# Patient Record
Sex: FEMALE | Race: WHITE | NOT HISPANIC OR LATINO | Employment: FULL TIME | URBAN - METROPOLITAN AREA
[De-identification: names, ages, dates, MRNs, and addresses within clinical notes are randomized per-mention and may not be internally consistent; named-entity substitution may affect disease eponyms.]

---

## 2016-06-03 LAB
HPV ABSTRACT: NORMAL
PAP-ABSTRACT: NORMAL

## 2018-09-17 ENCOUNTER — TRANSFERRED RECORDS (OUTPATIENT)
Dept: HEALTH INFORMATION MANAGEMENT | Facility: CLINIC | Age: 48
End: 2018-09-17

## 2018-11-13 ENCOUNTER — TRANSFERRED RECORDS (OUTPATIENT)
Dept: HEALTH INFORMATION MANAGEMENT | Facility: CLINIC | Age: 48
End: 2018-11-13

## 2018-12-04 ENCOUNTER — TRANSFERRED RECORDS (OUTPATIENT)
Dept: HEALTH INFORMATION MANAGEMENT | Facility: CLINIC | Age: 48
End: 2018-12-04

## 2018-12-24 ENCOUNTER — TRANSFERRED RECORDS (OUTPATIENT)
Dept: HEALTH INFORMATION MANAGEMENT | Facility: CLINIC | Age: 48
End: 2018-12-24

## 2019-01-28 ENCOUNTER — OFFICE VISIT (OUTPATIENT)
Dept: FAMILY MEDICINE | Facility: CLINIC | Age: 49
End: 2019-01-28
Payer: COMMERCIAL

## 2019-01-28 VITALS
TEMPERATURE: 98.4 F | HEART RATE: 75 BPM | WEIGHT: 177 LBS | OXYGEN SATURATION: 97 % | BODY MASS INDEX: 28.45 KG/M2 | DIASTOLIC BLOOD PRESSURE: 72 MMHG | SYSTOLIC BLOOD PRESSURE: 110 MMHG | HEIGHT: 66 IN

## 2019-01-28 DIAGNOSIS — H66.001 ACUTE SUPPURATIVE OTITIS MEDIA OF RIGHT EAR WITHOUT SPONTANEOUS RUPTURE OF TYMPANIC MEMBRANE, RECURRENCE NOT SPECIFIED: ICD-10-CM

## 2019-01-28 PROBLEM — B00.9 HERPES SIMPLEX VIRUS (HSV) INFECTION: Status: ACTIVE | Noted: 2019-01-28

## 2019-01-28 PROCEDURE — 99203 OFFICE O/P NEW LOW 30 MIN: CPT | Performed by: NURSE PRACTITIONER

## 2019-01-28 RX ORDER — MULTIPLE VITAMINS W/ MINERALS TAB 9MG-400MCG
1 TAB ORAL DAILY
COMMUNITY

## 2019-01-28 RX ORDER — AMOXICILLIN 500 MG/1
500 CAPSULE ORAL 3 TIMES DAILY
Qty: 21 CAPSULE | Refills: 0 | Status: SHIPPED | OUTPATIENT
Start: 2019-01-28 | End: 2019-07-10

## 2019-01-28 RX ORDER — ACYCLOVIR 400 MG/1
400 TABLET ORAL
COMMUNITY
Start: 2018-11-13 | End: 2020-02-17 | Stop reason: ALTCHOICE

## 2019-01-28 RX ORDER — CHLORAL HYDRATE 500 MG
2 CAPSULE ORAL DAILY
COMMUNITY

## 2019-01-28 SDOH — HEALTH STABILITY: MENTAL HEALTH: HOW OFTEN DO YOU HAVE A DRINK CONTAINING ALCOHOL?: MONTHLY OR LESS

## 2019-01-28 SDOH — HEALTH STABILITY: MENTAL HEALTH: HOW OFTEN DO YOU HAVE 6 OR MORE DRINKS ON ONE OCCASION?: NOT ASKED

## 2019-01-28 SDOH — HEALTH STABILITY: MENTAL HEALTH: HOW MANY STANDARD DRINKS CONTAINING ALCOHOL DO YOU HAVE ON A TYPICAL DAY?: 1 OR 2

## 2019-01-28 ASSESSMENT — MIFFLIN-ST. JEOR: SCORE: 1453.59

## 2019-01-28 NOTE — PROGRESS NOTES
SUBJECTIVE:   Kimberly Rankin is a 48 year old female who presents to clinic today for right ear pain accompanied with runny nose, cough with grey/yellow sputum, congestion and crusting in ear that she reported had drained last night. She denies fever or chills. She does have a history of recurrent right ear infections with colds. Her daughter currently has a cold but no one else in the household is sick.      Acute Illness   Acute illness concerns: ear pain  Onset: 2-3 days    Fever: no    Chills/Sweats: no    Headache (location?): no    Sinus Pressure:no    Conjunctivitis:  no    Ear Pain: YES- Rt ear pain and drainage    Rhinorrhea: YES    Congestion: YES    Sore Throat: YES- last week      Cough: YES-productive of yellow sputum    Wheeze: no    Decreased Appetite: no    Nausea: no    Vomiting: no    Diarrhea:  no    Dysuria/Freq.: no    Fatigue/Achiness: YES    Sick/Strep Exposure: no     Therapies Tried and outcome: OTC expectorant and ibuprofen       Last CPE at OB/GYN was in December 2018, last pap smear completed in 2016, NIL and negative HPV found in care everywhere.      Problem list and histories reviewed & adjusted, as indicated.  Additional history: as documented    Patient Active Problem List   Diagnosis     Herpes simplex virus (HSV) infection - genital     Intramural leiomyoma of uterus     Temporomandibular joint disorder (TMJ)     Allergic rhinitis     Abnormal involuntary movement     History reviewed. No pertinent surgical history.    Social History     Tobacco Use     Smoking status: Never Smoker     Smokeless tobacco: Never Used   Substance Use Topics     Alcohol use: Yes     Frequency: Monthly or less     Drinks per session: 1 or 2     Family History   Problem Relation Age of Onset     Heart Disease Father      Dementia Father      Dementia Mother      Kidney Disease Daughter          Current Outpatient Medications   Medication Sig Dispense Refill     acyclovir (ZOVIRAX) 400 MG tablet Take  "400 mg by mouth       amoxicillin (AMOXIL) 500 MG capsule Take 1 capsule (500 mg) by mouth 3 times daily for 7 days 21 capsule 0     cholecalciferol (VITAMIN D-1000 MAX ST) 1000 units TABS Take 1 tablet by mouth every 24 hours       fish oil-omega-3 fatty acids 1000 MG capsule Take 2 g by mouth daily       multivitamin w/minerals (MULTI-VITAMIN) tablet Take 1 tablet by mouth daily       Probiotic Product (PROBIOTIC-10 PO)        No Known Allergies    Reviewed and updated as needed this visit by clinical staff  Tobacco  Allergies  Med Hx  Surg Hx  Fam Hx  Soc Hx      Reviewed and updated as needed this visit by Provider  Tobacco  Surg Hx  Fam Hx  Soc Hx        ROS:  CONSTITUTIONAL: NEGATIVE for fever, or chills  ENT/MOUTH: Right ear pain with drainage in the night, runny nose, denies throat and mouth pain, right ear with diminished hearing  RESP: Positive for cough with grey, yellow sputum, negative shortness of breath/wheezing  CV: NEGATIVE for chest pain or palpitations    OBJECTIVE:     /72   Pulse 75   Temp 98.4  F (36.9  C) (Oral)   Ht 1.683 m (5' 6.25\")   Wt 80.3 kg (177 lb)   LMP 01/13/2019 (Approximate)   SpO2 97%   BMI 28.35 kg/m    Body mass index is 28.35 kg/m .    GENERAL: healthy, alert and no distress  EYES: Eyes grossly normal to inspection, PERRL and conjunctivae and sclerae normal  HENT: Right ear TM is erythematous without clear bony landmarks, Left ear TM intact without erythema, bony landmarks present, negative for tragus tenderness to palpation bilaterally, nose and mouth without ulcers, lesions or erythema. Negative for sinus pain on palpation  NECK: no adenopathy, no asymmetry, masses, or scars   RESP: lungs clear to auscultation - no rales, rhonchi or wheezes  CV: regular rate and rhythm, normal S1 S2, no S3 or S4, no murmur, click or rub      Diagnostic Test Results:  none     ASSESSMENT/PLAN:     Kimberly was seen today for ear problem.    Diagnoses and all orders for " this visit:    Acute suppurative otitis media of right ear without spontaneous rupture of tympanic membrane, recurrence not specified  -     amoxicillin (AMOXIL) 500 MG capsule; Take 1 capsule (500 mg) by mouth 3 times daily for 7 days    Educated patient that they should be feeling better in the next 48-72 hours and if not to call or come in to the clinic. Tylenol can be taken prn for pain.     Pilar Gomez, RN, FNP Student    History and physical examination done with student nurse practitioner.  Student acted as scribe for this encounter.  I agree with assessment and plan for this patient.     [facility]

## 2019-01-28 NOTE — Clinical Note
Pt had a PAP on 6/14/16 at [facility]  Please abstract results and update health maintenance.  Pt had a Mammogran on 1/24/18 at [facility] "[employer]  Please abstract results and update health [facility].  Pt had a colonoscopy on 12/24/18 at [facility] "[employer]  Please abstract results and update health maintenance.

## 2019-01-28 NOTE — PATIENT INSTRUCTIONS
Kimberly was seen today for ear problem.    Diagnoses and all orders for this visit:    Acute suppurative otitis media of right ear without spontaneous rupture of tympanic membrane, recurrence not specified  -     amoxicillin (AMOXIL) 500 MG capsule; Take 1 capsule (500 mg) by mouth 3 times daily for 7 days  -     Tylenol or Ibuprofen as needed.      Follow-up with no improvement or worsening of symptoms.

## 2019-07-10 ENCOUNTER — OFFICE VISIT (OUTPATIENT)
Dept: FAMILY MEDICINE | Facility: CLINIC | Age: 49
End: 2019-07-10
Payer: COMMERCIAL

## 2019-07-10 VITALS
HEIGHT: 66 IN | BODY MASS INDEX: 28.28 KG/M2 | TEMPERATURE: 98.3 F | HEART RATE: 75 BPM | SYSTOLIC BLOOD PRESSURE: 122 MMHG | DIASTOLIC BLOOD PRESSURE: 76 MMHG | OXYGEN SATURATION: 98 % | WEIGHT: 176 LBS

## 2019-07-10 DIAGNOSIS — S83.206A TEAR OF MENISCUS OF RIGHT KNEE AS CURRENT INJURY, UNSPECIFIED MENISCUS, UNSPECIFIED TEAR TYPE, INITIAL ENCOUNTER: ICD-10-CM

## 2019-07-10 DIAGNOSIS — Z01.818 PREOP GENERAL PHYSICAL EXAM: Primary | ICD-10-CM

## 2019-07-10 DIAGNOSIS — E66.3 OVERWEIGHT (BMI 25.0-29.9): ICD-10-CM

## 2019-07-10 DIAGNOSIS — Z92.89: ICD-10-CM

## 2019-07-10 LAB
ERYTHROCYTE [DISTWIDTH] IN BLOOD BY AUTOMATED COUNT: 16.1 % (ref 10–15)
HCG UR QL: NEGATIVE
HCT VFR BLD AUTO: 38.2 % (ref 35–47)
HGB BLD-MCNC: 12.5 G/DL (ref 11.7–15.7)
MCH RBC QN AUTO: 28.2 PG (ref 26.5–33)
MCHC RBC AUTO-ENTMCNC: 32.7 G/DL (ref 31.5–36.5)
MCV RBC AUTO: 86 FL (ref 78–100)
PLATELET # BLD AUTO: 246 10E9/L (ref 150–450)
RBC # BLD AUTO: 4.44 10E12/L (ref 3.8–5.2)
WBC # BLD AUTO: 3.4 10E9/L (ref 4–11)

## 2019-07-10 PROCEDURE — 36415 COLL VENOUS BLD VENIPUNCTURE: CPT | Performed by: PHYSICIAN ASSISTANT

## 2019-07-10 PROCEDURE — 80048 BASIC METABOLIC PNL TOTAL CA: CPT | Performed by: PHYSICIAN ASSISTANT

## 2019-07-10 PROCEDURE — 81025 URINE PREGNANCY TEST: CPT | Performed by: PHYSICIAN ASSISTANT

## 2019-07-10 PROCEDURE — 85027 COMPLETE CBC AUTOMATED: CPT | Performed by: PHYSICIAN ASSISTANT

## 2019-07-10 PROCEDURE — 99214 OFFICE O/P EST MOD 30 MIN: CPT | Performed by: PHYSICIAN ASSISTANT

## 2019-07-10 ASSESSMENT — MIFFLIN-ST. JEOR: SCORE: 1444.05

## 2019-07-10 NOTE — PROGRESS NOTES
[facility]  [address]  [place] [address]  [phone]  Dept: [phone]    PRE-OP EVALUATION:  Today's date: 7/10/2019    Kimberly Rankin (: 1970) presents for pre-operative evaluation assessment as requested by Dr. Roosevelt Barton.  She requires evaluation and anesthesia risk assessment prior to undergoing surgery/procedure for treatment of R knee meniscal tear.    Proposed Surgery/ Procedure: Right knee arthoscopy  Date of Surgery/ Procedure: 2019  Time of Surgery/ Procedure: 8 am  Hospital/Surgical Facility: [facility]  Fax number for surgical facility: [phone]  Primary Physician: [facility], [facility] [place]  Type of Anesthesia Anticipated: Pt not sure     Patient has a Health Care Directive or Living Will:  NO    1. NO - Do you have a history of heart attack, stroke, stent, bypass or surgery on an artery in the head, neck, heart or legs?  2. NO - Do you ever have any pain or discomfort in your chest?  3. NO - Do you have a history of  Heart Failure?  4. NO - Are you troubled by shortness of breath when: walking on the level, up a slight hill or at night?  5. NO - Do you currently have a cold, bronchitis or other respiratory infection?  6. NO - Do you have a cough, shortness of breath or wheezing?  7. NO - Do you sometimes get pains in the calves of your legs when you walk?  8. NO - Do you or anyone in your family have previous history of blood clots?  9. NO - Do you or does anyone in your family have a serious bleeding problem such as prolonged bleeding following surgeries or cuts?  10. NO - Have you ever had problems with anemia or been told to take iron pills?  11. NO - Have you had any abnormal blood loss such as black, tarry or bloody stools, or abnormal vaginal bleeding?  12. NO - Have you ever had a blood transfusion?  13. YES - stopped breathing and required ambulance transfer at dental office in  when she was getting her teeth pulled.  She is not sure if she got too much or what the problem was. Have you or any of your relatives ever had problems with anesthesia?   14. NO - Do you have sleep apnea, excessive snoring or daytime drowsiness?  15. NO - Do you have any prosthetic heart valves?  16. NO - Do you have prosthetic joints?  17. NO - Is there any chance that you may be pregnant?      HPI:     HPI related to upcoming procedure: Kimberly is a 48 yo female here today fore pre-op. She has been suffering with R knee pain x2 months. Was evaluated by [facility] and found to have a meniscal tear. Is now pursuing arthroscopy.     LMP: 6/15   has had a vasectomy    See problem list for active medical problems.  Problems all longstanding and stable, except as noted/documented.  See ROS for pertinent symptoms related to these conditions.      MEDICAL HISTORY:     Patient Active Problem List    Diagnosis Date Noted     Overweight (BMI 25.0-29.9) 07/12/2019     Priority: Medium     History of adverse effect of anesthesia 07/12/2019     Priority: Medium     Tear of meniscus of right knee as current injury, unspecified meniscus, unspecified tear type, initial encounter 07/10/2019     Priority: Medium     Herpes simplex virus (HSV) infection - genital 01/28/2019     Priority: Medium     Overview:   Herpes simplex without mention of complication       Intramural leiomyoma of uterus 06/08/2016     Priority: Medium     Allergic rhinitis 06/07/2003     Priority: Medium     Overview:   Rhinitis Allergic  NOS        Past Medical History:   Diagnosis Date     Abnormal involuntary movement 9/29/2009    Overview:  LW Modifier:  left shoulder and thigh LW Onset:  9/1/2009 ; Fasciculations Benign     Genital herpes     taking acyclovir regulary as a suppressive medicatino     Otitis media     Patient reports having this a recurrent problem with colds     Seasonal allergies     Dogs and Cats     Temporomandibular joint disorder (TMJ) 12/16/2009    Overview:  LW Modifier:  " eval at [facility] LW Onset:  2009 ; Temporomandibular Joint Syndrome     History reviewed. No pertinent surgical history.  Current Outpatient Medications   Medication Sig Dispense Refill     acyclovir (ZOVIRAX) 400 MG tablet Take 400 mg by mouth       cholecalciferol (VITAMIN D-1000 MAX ST) 1000 units TABS Take 1 tablet by mouth every 24 hours       fish oil-omega-3 fatty acids 1000 MG capsule Take 2 g by mouth daily       multivitamin w/minerals (MULTI-VITAMIN) tablet Take 1 tablet by mouth daily       Probiotic Product (PROBIOTIC-10 PO)        OTC products: None, except as noted above and a thyroid supplement     No Known Allergies   Latex Allergy: NO    Social History     Tobacco Use     Smoking status: Never Smoker     Smokeless tobacco: Never Used   Substance Use Topics     Alcohol use: Yes     Frequency: Monthly or less     Drinks per session: 1 or 2     History   Drug Use No       REVIEW OF SYSTEMS:   CONSTITUTIONAL: NEGATIVE for fever, chills, change in weight  INTEGUMENTARY/SKIN: NEGATIVE for worrisome rashes, moles or lesions  EYES: NEGATIVE for vision changes or irritation  ENT/MOUTH: NEGATIVE for ear, mouth and throat problems  RESP: NEGATIVE for significant cough or SOB  CV: NEGATIVE for chest pain, palpitations or peripheral edema  GI: NEGATIVE for nausea, abdominal pain, heartburn, or change in bowel habits  : NEGATIVE for frequency, dysuria, or hematuria  MUSCULOSKELETAL: + for R knee pain. NEGATIVE for significant arthralgias or myalgia  NEURO: NEGATIVE for weakness, dizziness or paresthesias  ENDOCRINE: NEGATIVE for temperature intolerance, skin/hair changes  HEME: NEGATIVE for bleeding problems  PSYCHIATRIC: NEGATIVE for changes in mood or affect    EXAM:   /76 (BP Location: Right arm, Cuff Size: Adult Regular)   Pulse 75   Temp 98.3  F (36.8  C) (Oral)   Ht 1.683 m (5' 6.25\")   Wt 79.8 kg (176 lb)   SpO2 98%   BMI 28.19 kg/m      GENERAL APPEARANCE: healthy, alert and no distress     " EYES: EOMI, PERRL     HENT: ear canals and TM's normal and nose and mouth without ulcers or lesions     NECK: no adenopathy, no asymmetry, masses, or scars and thyroid normal to palpation     RESP: lungs clear to auscultation - no rales, rhonchi or wheezes     CV: regular rates and rhythm, normal S1 S2, no S3 or S4 and no murmur, click or rub     ABDOMEN:  soft, nontender, no HSM or masses and bowel sounds normal     MS: extremities normal- no gross deformities noted, no evidence of inflammation in joints, FROM in all extremities.     SKIN: no suspicious lesions or rashes     NEURO: Normal strength and tone, sensory exam grossly normal, mentation intact and speech normal     PSYCH: mentation appears normal. and affect normal/bright     LYMPHATICS: No cervical adenopathy    DIAGNOSTICS:   EKG: Not indicated due to non-vascular surgery and low risk of event (age <65 and without cardiac risk factors)    No results for input(s): HGB, PLT, INR, NA, POTASSIUM, CR, A1C in the last 84233 hours.   Results for orders placed or performed in visit on 07/10/19   HCG qualitative urine   Result Value Ref Range    HCG Qual Urine Negative NEG^Negative   Basic metabolic panel  (Ca, Cl, CO2, Creat, Gluc, K, Na, BUN)   Result Value Ref Range    Sodium 142 133 - 144 mmol/L    Potassium 3.8 3.4 - 5.3 mmol/L    Chloride 109 94 - 109 mmol/L    Carbon Dioxide 24 20 - 32 mmol/L    Anion Gap 9 3 - 14 mmol/L    Glucose 76 70 - 99 mg/dL    Urea Nitrogen 8 7 - 30 mg/dL    Creatinine 0.69 0.52 - 1.04 mg/dL    GFR Estimate >90 >60 mL/min/[1.73_m2]    GFR Estimate If Black >90 >60 mL/min/[1.73_m2]    Calcium 8.6 8.5 - 10.1 mg/dL   CBC with platelets   Result Value Ref Range    WBC 3.4 (L) 4.0 - 11.0 10e9/L    RBC Count 4.44 3.8 - 5.2 10e12/L    Hemoglobin 12.5 11.7 - 15.7 g/dL    Hematocrit 38.2 35.0 - 47.0 %    MCV 86 78 - 100 fl    MCH 28.2 26.5 - 33.0 pg    MCHC 32.7 31.5 - 36.5 g/dL    RDW 16.1 (H) 10.0 - 15.0 %    Platelet Count 246 150 - 450  10e9/L     IMPRESSION:   Reason for surgery/procedure: meniscal tear/arthroscopic repair  Diagnosis/reason for consult: pre-op, history of problem with anesthesia during dental procedure.    The proposed surgical procedure is considered INTERMEDIATE risk.    REVISED CARDIAC RISK INDEX  The patient has the following serious cardiovascular risks for perioperative complications such as (MI, PE, VFib and 3  AV Block):  No serious cardiac risks  INTERPRETATION: 0 risks: Class I (very low risk - 0.4% complication rate)    The patient has the following additional risks for perioperative complications:  No identified additional risks      ICD-10-CM    1. Preop general physical exam Z01.818 HCG qualitative urine     Basic metabolic panel  (Ca, Cl, CO2, Creat, Gluc, K, Na, BUN)     CBC with platelets   2. Tear of meniscus of right knee as current injury, unspecified meniscus, unspecified tear type, initial encounter S83.206A    3. History of adverse effect of anesthesia Z92.89    4. Overweight (BMI 25.0-29.9) E66.3        RECOMMENDATIONS:       APPROVAL GIVEN to proceed with proposed procedure, without further diagnostic evaluation.       Signed Electronically by: Zayda Torres PA-C    Copy of this evaluation report is provided to requesting physician.    [facility] Preop Guidelines    Revised Cardiac Risk Index

## 2019-07-11 LAB
ANION GAP SERPL CALCULATED.3IONS-SCNC: 9 MMOL/L (ref 3–14)
BUN SERPL-MCNC: 8 MG/DL (ref 7–30)
CALCIUM SERPL-MCNC: 8.6 MG/DL (ref 8.5–10.1)
CHLORIDE SERPL-SCNC: 109 MMOL/L (ref 94–109)
CO2 SERPL-SCNC: 24 MMOL/L (ref 20–32)
CREAT SERPL-MCNC: 0.69 MG/DL (ref 0.52–1.04)
GFR SERPL CREATININE-BSD FRML MDRD: >90 ML/MIN/{1.73_M2}
GLUCOSE SERPL-MCNC: 76 MG/DL (ref 70–99)
POTASSIUM SERPL-SCNC: 3.8 MMOL/L (ref 3.4–5.3)
SODIUM SERPL-SCNC: 142 MMOL/L (ref 133–144)

## 2019-07-12 PROBLEM — Z92.89: Status: ACTIVE | Noted: 2019-07-12

## 2019-07-12 PROBLEM — E66.3 OVERWEIGHT (BMI 25.0-29.9): Status: ACTIVE | Noted: 2019-07-12

## 2019-07-12 NOTE — RESULT ENCOUNTER NOTE
Dear Kimberly,      Your recent test results are noted below:    -Normal red blood cell (hgb) levels, minimally low white blood cell count and normal platelet levels. ADVISE: You may still proceed with surgery as planned. We should repeat this at your routine health physical though just to be sure this does not continue to trend downwards. Please return for routine health physical in the future as previously discussed at your appointment.   -Kidney function is normal (Cr, GFR), Sodium is normal, Potassium is normal, Calcium is normal, Glucose is normal.   -Pregnancy test was normal/negative.     For additional lab test information, labtestsonline.org is an excellent reference. Please contact the clinic at [phone] with any further questions or concerns.    Sincerely,      Zayda Torres PA-C  [facility]

## 2019-08-16 ENCOUNTER — HOSPITAL ENCOUNTER (OUTPATIENT)
Dept: ULTRASOUND IMAGING | Facility: CLINIC | Age: 49
Discharge: HOME | End: 2019-08-16
Attending: ORTHOPAEDIC SURGERY | Admitting: ORTHOPAEDIC SURGERY
Payer: COMMERCIAL

## 2019-08-16 DIAGNOSIS — Z47.89 AFTERCARE FOLLOWING SURGERY OF THE MUSCULOSKELETAL SYSTEM: ICD-10-CM

## 2019-08-16 PROCEDURE — 93971 EXTREMITY STUDY: CPT | Mod: RT

## 2019-08-23 ENCOUNTER — HOSPITAL ENCOUNTER (EMERGENCY)
Facility: CLINIC | Age: 49
Discharge: HOME | End: 2019-08-23
Attending: EMERGENCY MEDICINE | Admitting: EMERGENCY MEDICINE
Payer: COMMERCIAL

## 2019-08-23 VITALS
RESPIRATION RATE: 16 BRPM | TEMPERATURE: 98.5 F | HEART RATE: 74 BPM | DIASTOLIC BLOOD PRESSURE: 70 MMHG | SYSTOLIC BLOOD PRESSURE: 115 MMHG | OXYGEN SATURATION: 99 %

## 2019-08-23 DIAGNOSIS — R20.8 BURNING SENSATION: ICD-10-CM

## 2019-08-23 LAB
ANION GAP SERPL CALCULATED.3IONS-SCNC: 6 MMOL/L (ref 3–14)
BASOPHILS # BLD AUTO: 0 10E9/L (ref 0–0.2)
BASOPHILS NFR BLD AUTO: 0.8 %
BUN SERPL-MCNC: 8 MG/DL (ref 7–30)
CALCIUM SERPL-MCNC: 8.6 MG/DL (ref 8.5–10.1)
CHLORIDE SERPL-SCNC: 109 MMOL/L (ref 94–109)
CO2 SERPL-SCNC: 25 MMOL/L (ref 20–32)
CREAT SERPL-MCNC: 0.47 MG/DL (ref 0.52–1.04)
DIFFERENTIAL METHOD BLD: NORMAL
EOSINOPHIL # BLD AUTO: 0.1 10E9/L (ref 0–0.7)
EOSINOPHIL NFR BLD AUTO: 1.4 %
ERYTHROCYTE [DISTWIDTH] IN BLOOD BY AUTOMATED COUNT: 15 % (ref 10–15)
GFR SERPL CREATININE-BSD FRML MDRD: >90 ML/MIN/{1.73_M2}
GLUCOSE SERPL-MCNC: 115 MG/DL (ref 70–99)
HCT VFR BLD AUTO: 38.7 % (ref 35–47)
HGB BLD-MCNC: 12.7 G/DL (ref 11.7–15.7)
IMM GRANULOCYTES # BLD: 0 10E9/L (ref 0–0.4)
IMM GRANULOCYTES NFR BLD: 0.2 %
LYMPHOCYTES # BLD AUTO: 1.4 10E9/L (ref 0.8–5.3)
LYMPHOCYTES NFR BLD AUTO: 27.5 %
MCH RBC QN AUTO: 29.1 PG (ref 26.5–33)
MCHC RBC AUTO-ENTMCNC: 32.8 G/DL (ref 31.5–36.5)
MCV RBC AUTO: 89 FL (ref 78–100)
MONOCYTES # BLD AUTO: 0.5 10E9/L (ref 0–1.3)
MONOCYTES NFR BLD AUTO: 10.8 %
NEUTROPHILS # BLD AUTO: 3 10E9/L (ref 1.6–8.3)
NEUTROPHILS NFR BLD AUTO: 59.3 %
NRBC # BLD AUTO: 0 10*3/UL
NRBC BLD AUTO-RTO: 0 /100
PLATELET # BLD AUTO: 266 10E9/L (ref 150–450)
POTASSIUM SERPL-SCNC: 3.4 MMOL/L (ref 3.4–5.3)
RBC # BLD AUTO: 4.36 10E12/L (ref 3.8–5.2)
SODIUM SERPL-SCNC: 140 MMOL/L (ref 133–144)
WBC # BLD AUTO: 5 10E9/L (ref 4–11)

## 2019-08-23 PROCEDURE — 99283 EMERGENCY DEPT VISIT LOW MDM: CPT

## 2019-08-23 PROCEDURE — 85025 COMPLETE CBC W/AUTO DIFF WBC: CPT | Performed by: EMERGENCY MEDICINE

## 2019-08-23 PROCEDURE — 80048 BASIC METABOLIC PNL TOTAL CA: CPT | Performed by: EMERGENCY MEDICINE

## 2019-08-23 PROCEDURE — 25000132 ZZH RX MED GY IP 250 OP 250 PS 637: Performed by: EMERGENCY MEDICINE

## 2019-08-23 RX ORDER — LORAZEPAM 1 MG/1
1 TABLET ORAL ONCE
Status: COMPLETED | OUTPATIENT
Start: 2019-08-23 | End: 2019-08-23

## 2019-08-23 RX ORDER — GABAPENTIN 100 MG/1
100 CAPSULE ORAL 3 TIMES DAILY
Qty: 21 CAPSULE | Refills: 0 | Status: SHIPPED | OUTPATIENT
Start: 2019-08-23 | End: 2019-08-26

## 2019-08-23 RX ORDER — LORAZEPAM 2 MG/ML
0.5 INJECTION INTRAMUSCULAR ONCE
Status: DISCONTINUED | OUTPATIENT
Start: 2019-08-23 | End: 2019-08-23

## 2019-08-23 RX ORDER — HYDROXYZINE HYDROCHLORIDE 25 MG/1
25 TABLET, FILM COATED ORAL 3 TIMES DAILY PRN
Qty: 15 TABLET | Refills: 0 | Status: SHIPPED | OUTPATIENT
Start: 2019-08-23 | End: 2019-09-03

## 2019-08-23 RX ADMIN — LORAZEPAM 1 MG: 1 TABLET ORAL at 13:41

## 2019-08-23 ASSESSMENT — ENCOUNTER SYMPTOMS
NUMBNESS: 1
VOMITING: 0
DIARRHEA: 1
NAUSEA: 1
DIAPHORESIS: 1
MYALGIAS: 1
ABDOMINAL PAIN: 0
SHORTNESS OF BREATH: 0

## 2019-08-23 NOTE — ED NOTES
Patient discharged to home. Patient received follow-up information with PCP as scheduled and neurology as well as medication instructions for Gabapentin and Atarax. Patient received discharge instructions and has no other questions at this time.

## 2019-08-23 NOTE — ED AVS SNAPSHOT
[facility] Emergency Department  [address]  [facility] [address]  Phone:  [phone]  Fax:  [phone]                                    Kimberly Rankin   MRN: [id number]    Department:  [facility] Emergency Department   Date of Visit:  8/23/2019           After Visit Summary Signature Page    I have received my discharge instructions, and my questions have been answered. I have discussed any challenges I see with this plan with the nurse or doctor.    ..........................................................................................................................................  Patient/Patient Representative Signature      ..........................................................................................................................................  Patient Representative Print Name and Relationship to Patient    ..................................................               ................................................  Date                                   Time    ..........................................................................................................................................  Reviewed by Signature/Title    ...................................................              ..............................................  Date                                               Time          22EPIC Rev 08/18

## 2019-08-23 NOTE — DISCHARGE INSTRUCTIONS
Follow-up with your doctor early next week as scheduled.  May try hydroxyzine over weekend for anxiety/difficulty sleeping.   Return to emergency department for difficulty breathing, inability to walk/get around, worsening symptoms, fevers greater than 100.4, or for any other concerns.

## 2019-08-23 NOTE — ED PROVIDER NOTES
"  History     Chief Complaint:  Generalized Body Aches    HPI   Kimberly Rankin is a 49 year old female with a history of RSD who presents with her sister for the evaluation of generalized body aches. The patient reports that over the past few days she has been experiencing worsening \"burning\" across her entire body, nausea, diaphoresis, prompting her to the ED. The patient notes that she has been experiencing difficulty sleeping at night to her her \"burning\" sensation and that her hands will go numb. She states that she had a knee surgery done five weeks ago, that she has been going \"down hill\" since then, and that the burning sensation moved from her legs to her upper body over the past few days. She also notes that she has been experiencing a dry mouth, shakiness, and intermittent diarrhea. The patient denies chest pain, shortness of breath, visual changes, abdominal pain, vomiting, and other issues. No recent medication changes.     Allergies:  No known drug allergies      Medications:    Zovirax  Vitamin D    Past Medical History:    Abnormal involuntary movement  Genital herpes  Lumbar back pain  Otitis media  Temporomandibular joint disorder  Intramural leiomyoma of uterus  RSV     Past Surgical History:    History reviewed. No pertinent surgical history.     Family History:    Heat disease  Dementia     Social History:  Smoking status: Never Smoker  Alcohol use: Yes  Drug use: No   Marital Status:   [2]     Review of Systems   Constitutional: Positive for diaphoresis.   Eyes: Negative for visual disturbance.   Respiratory: Negative for shortness of breath.    Cardiovascular: Negative for chest pain.   Gastrointestinal: Positive for diarrhea and nausea. Negative for abdominal pain and vomiting.   Musculoskeletal: Positive for myalgias.   Neurological: Positive for numbness.   All other systems reviewed and are negative.      Physical Exam     Patient Vitals for the past 24 hrs:   BP Temp Temp src " Pulse Heart Rate Resp SpO2   08/23/19 1430 -- -- -- -- -- -- 97 %   08/23/19 1415 -- -- -- -- -- -- 98 %   08/23/19 1400 -- -- -- -- -- -- 98 %   08/23/19 1345 120/73 -- -- 70 -- -- 97 %   08/23/19 1344 120/73 -- -- -- -- 16 97 %   08/23/19 1041 (!) 125/92 98.5  F (36.9  C) Temporal -- 82 12 97 %        Physical Exam  Nursing note and vitals reviewed.  Constitutional: Cooperative.   HENT:   Mouth/Throat: Moist mucous membranes.   Eyes: EOMI, nonicteric sclera  Cardiovascular: Normal rate, regular rhythm, no murmurs, rubs, or gallops  Pulmonary/Chest: Effort normal and breath sounds normal. No respiratory distress. No wheezes. No rales.   Abdominal: Soft. Nontender, nondistended, no guarding or rigidity. BS present.   Musculoskeletal: Normal range of motion.   Neurological: Alert. Moves all extremities spontaneously.   Skin: Skin is warm and dry. No rash noted. No sensitivity to skin touch.   Psychiatric: anxious mood and affect.       Emergency Department Course   Laboratory:  CBC: WNL (WBC 5.0, HGB 12.7, )  BMP: Creatinine 0.47 (L), Glucose 115 (H)    Interventions:  Medications   LORazepam (ATIVAN) tablet 1 mg (1 mg Oral Given 8/23/19 1341)     Emergency Department Course:  Past medical records, nursing notes, and vitals reviewed.  1253: I performed an exam of the patient and obtained history, as documented above.     IV inserted and blood drawn.     I rechecked the patient.  Findings and plan explained to the Patient. Patient discharged home with instructions regarding supportive care, medications, and reasons to return. The importance of close follow-up was reviewed.      Impression & Plan    Medical Decision Making:  Patient presents with chief complaint of diffuse body burning over the last few days.  Patient reports a recent diagnosis of reflex sympathetic dystrophy in her right lower extremity and she is concerned that this has spread to the rest of her body.  Discussed with patient that her signs and  "symptoms are inconsistent with this diagnosis as I am able to touch her lower extremity without her having any symptoms related to this and that she does not display the significant sensitivity to touch that is typical of this diagnosis.  Also discussed that this usually does not spread to the rest of the body like she describes.  Also considered electrolyte abnormality, though labs are normal.  Patient certainly appears anxious here, though it is difficult to ascertain whether this is the cause of her symptoms or a result of them.  She did feel improved after some Ativan however.  Given the diffuse nature of her \"burning, I elected to try some gabapentin to see if this can help relieve her symptoms.  We will also prescribe some hydroxyzine for sleep and possible anxiety.  Ultimately, no emergent etiology identified.  She has follow-up with her primary care on Monday which is appropriate.  She is discharged in stable condition.  All questions answered.    Diagnosis:    ICD-10-CM    1. Burning sensation R20.8        Disposition:  discharged to home    Discharge Medications:  Discharge Medication List as of 8/23/2019  3:20 PM      START taking these medications    Details   gabapentin (NEURONTIN) 100 MG capsule Take 1 capsule (100 mg) by mouth 3 times daily for 7 days, Disp-21 capsule, R-0, Local Print      hydrOXYzine (ATARAX) 25 MG tablet Take 1 tablet (25 mg) by mouth 3 times daily as needed for anxiety, Disp-15 tablet, R-0, Local Print           Scribe Disclosure:  I, Rajeev Koo, am serving as a scribe at 12:42 PM on 8/23/2019 to document services personally performed by Rashawn Olmstead MD based on my observations and the provider's statements to me.      Rajeev Koo  8/23/2019   [facility] EMERGENCY DEPARTMENT       Rashawn Olmstead MD  08/23/19 1955    "

## 2019-08-23 NOTE — ED TRIAGE NOTES
"Pt c/o \"burning nevre pain all over body\" for past week. Pt comes in today because it's not getting better. Has not seen PCP. Denies numbness/tingling, CP, SOB, HA, visual changes. ABC intact. A&O x4.   "

## 2019-08-26 ENCOUNTER — OFFICE VISIT (OUTPATIENT)
Dept: FAMILY MEDICINE | Facility: CLINIC | Age: 49
End: 2019-08-26
Payer: COMMERCIAL

## 2019-08-26 VITALS
DIASTOLIC BLOOD PRESSURE: 72 MMHG | SYSTOLIC BLOOD PRESSURE: 118 MMHG | HEIGHT: 66 IN | WEIGHT: 176 LBS | OXYGEN SATURATION: 98 % | TEMPERATURE: 98.4 F | RESPIRATION RATE: 14 BRPM | HEART RATE: 76 BPM | BODY MASS INDEX: 28.28 KG/M2

## 2019-08-26 DIAGNOSIS — R20.8 BURNING SENSATION: Primary | ICD-10-CM

## 2019-08-26 PROCEDURE — 99213 OFFICE O/P EST LOW 20 MIN: CPT | Performed by: PHYSICIAN ASSISTANT

## 2019-08-26 RX ORDER — GABAPENTIN 100 MG/1
100 CAPSULE ORAL 3 TIMES DAILY
Qty: 90 CAPSULE | Refills: 1 | Status: SHIPPED | OUTPATIENT
Start: 2019-08-26 | End: 2019-09-03

## 2019-08-26 ASSESSMENT — MIFFLIN-ST. JEOR: SCORE: 1444.05

## 2019-08-26 NOTE — PROGRESS NOTES
Subjective     Kimberly aRnkin is a 49 year old female who presents to clinic today for the following health issues:    HPI   ED/UC Followup:    Facility:  [facility]  Date of visit: 08/23/2019  Reason for visit: Body Aches  Current Status: feeling the same she would like medication refilled from the ER. (gabapentin)     Kimberly recently had a right meniscus repair in July.  Over the past 2 weeks she has been experiencing a burning sensation around the surgical site and in her right foot, she has noted some discoloration of the right foot compared to the left.  Over the past 1-2 weeks she has developed a constant burning sensation to her arms and chest bilaterally.  She was seen in the ED for this on [id number].  Labs were WNL and it was suggested to her that this could be secondary to anxiety.  She was started on gabapentin and needs a refill.  Of note, she has a referral to neurology which she would like to pursue and has an appointment with her orthopaedic doctor this week.  She has not yet noticed improvement with gabapentin.               Patient Active Problem List   Diagnosis     Herpes simplex virus (HSV) infection - genital     Intramural leiomyoma of uterus     Allergic rhinitis     Tear of meniscus of right knee as current injury, unspecified meniscus, unspecified tear type, initial encounter     Overweight (BMI 25.0-29.9)     History of adverse effect of anesthesia     No past surgical history on file.    Social History     Tobacco Use     Smoking status: Never Smoker     Smokeless tobacco: Never Used   Substance Use Topics     Alcohol use: Yes     Frequency: Monthly or less     Drinks per session: 1 or 2     Family History   Problem Relation Age of Onset     Heart Disease Father      Dementia Father      Dementia Mother      Kidney Disease Daughter          Current Outpatient Medications   Medication Sig Dispense Refill     acyclovir (ZOVIRAX) 400 MG tablet Take 400 mg by mouth        "cholecalciferol (VITAMIN D-1000 MAX ST) 1000 units TABS Take 1 tablet by mouth every 24 hours       fish oil-omega-3 fatty acids 1000 MG capsule Take 2 g by mouth daily       gabapentin (NEURONTIN) 100 MG capsule Take 1 capsule (100 mg) by mouth 3 times daily 90 capsule 1     hydrOXYzine (ATARAX) 25 MG tablet Take 1 tablet (25 mg) by mouth 3 times daily as needed for anxiety 15 tablet 0     multivitamin w/minerals (MULTI-VITAMIN) tablet Take 1 tablet by mouth daily       Probiotic Product (PROBIOTIC-10 PO)        No Known Allergies      Reviewed and updated as needed this visit by Provider         Review of Systems   ROS COMP: Constitutional, HEENT, cardiovascular, pulmonary, GI, , musculoskeletal, neuro, skin, endocrine and psych systems are negative, except as otherwise noted.      Objective    /72   Pulse 76   Temp 98.4  F (36.9  C) (Tympanic)   Resp 14   Ht 1.683 m (5' 6.25\")   Wt 79.8 kg (176 lb)   LMP 08/26/2019   SpO2 98%   BMI 28.19 kg/m    Body mass index is 28.19 kg/m .  Physical Exam   GENERAL: healthy, alert and no distress  RESP: lungs clear to auscultation - no rales, rhonchi or wheezes  CV: regular rate and rhythm, normal S1 S2, no S3 or S4, no murmur, click or rub  NEURO: Normal strength and tone, mentation intact and speech normal  PSYCH: mentation appears normal, affect normal/bright    Diagnostic Test Results:  Labs reviewed in Epic        Assessment & Plan     1. Burning sensation  Offered to check vitamin B12 and magnesium levels today, she has a physical scheduled next week and wishes to wait until then.  She will plan to follow up with neurology as well as ortho this week. She is not not interested in starting anxiety medication at this time.   - gabapentin (NEURONTIN) 100 MG capsule; Take 1 capsule (100 mg) by mouth 3 times daily  Dispense: 90 capsule; Refill: 1     BMI:   Estimated body mass index is 28.19 kg/m  as calculated from the following:    Height as of this " "encounter: 1.683 m (5' 6.25\").    Weight as of this encounter: 79.8 kg (176 lb).         No follow-ups on file.    Dev Nelson PA-C  [facility] [place]      "

## 2019-09-03 ENCOUNTER — OFFICE VISIT (OUTPATIENT)
Dept: FAMILY MEDICINE | Facility: CLINIC | Age: 49
End: 2019-09-03
Payer: COMMERCIAL

## 2019-09-03 VITALS
HEART RATE: 94 BPM | DIASTOLIC BLOOD PRESSURE: 72 MMHG | TEMPERATURE: 98.1 F | HEIGHT: 66 IN | OXYGEN SATURATION: 98 % | BODY MASS INDEX: 28.28 KG/M2 | WEIGHT: 176 LBS | SYSTOLIC BLOOD PRESSURE: 108 MMHG

## 2019-09-03 DIAGNOSIS — Z00.00 ROUTINE GENERAL MEDICAL EXAMINATION AT A HEALTH CARE FACILITY: Primary | ICD-10-CM

## 2019-09-03 DIAGNOSIS — Z13.220 SCREENING FOR LIPID DISORDERS: ICD-10-CM

## 2019-09-03 DIAGNOSIS — R20.8 BURNING SENSATION: ICD-10-CM

## 2019-09-03 LAB
ALBUMIN SERPL-MCNC: 3.4 G/DL (ref 3.4–5)
ALP SERPL-CCNC: 39 U/L (ref 40–150)
ALT SERPL W P-5'-P-CCNC: 15 U/L (ref 0–50)
AST SERPL W P-5'-P-CCNC: 10 U/L (ref 0–45)
BILIRUB DIRECT SERPL-MCNC: <0.1 MG/DL (ref 0–0.2)
BILIRUB SERPL-MCNC: 0.2 MG/DL (ref 0.2–1.3)
CHOLEST SERPL-MCNC: 241 MG/DL
HDLC SERPL-MCNC: 57 MG/DL
LDLC SERPL CALC-MCNC: 172 MG/DL
MAGNESIUM SERPL-MCNC: 1.9 MG/DL (ref 1.6–2.3)
NONHDLC SERPL-MCNC: 184 MG/DL
PROT SERPL-MCNC: 6.7 G/DL (ref 6.8–8.8)
TRIGL SERPL-MCNC: 61 MG/DL
TSH SERPL DL<=0.005 MIU/L-ACNC: 3.5 MU/L (ref 0.4–4)
VIT B12 SERPL-MCNC: 1015 PG/ML (ref 193–986)

## 2019-09-03 PROCEDURE — 82607 VITAMIN B-12: CPT | Performed by: NURSE PRACTITIONER

## 2019-09-03 PROCEDURE — 36415 COLL VENOUS BLD VENIPUNCTURE: CPT | Performed by: NURSE PRACTITIONER

## 2019-09-03 PROCEDURE — 99396 PREV VISIT EST AGE 40-64: CPT | Performed by: NURSE PRACTITIONER

## 2019-09-03 PROCEDURE — 83735 ASSAY OF MAGNESIUM: CPT | Performed by: NURSE PRACTITIONER

## 2019-09-03 PROCEDURE — 80061 LIPID PANEL: CPT | Performed by: NURSE PRACTITIONER

## 2019-09-03 PROCEDURE — 80076 HEPATIC FUNCTION PANEL: CPT | Performed by: NURSE PRACTITIONER

## 2019-09-03 PROCEDURE — 99213 OFFICE O/P EST LOW 20 MIN: CPT | Mod: 25 | Performed by: NURSE PRACTITIONER

## 2019-09-03 PROCEDURE — 84443 ASSAY THYROID STIM HORMONE: CPT | Performed by: NURSE PRACTITIONER

## 2019-09-03 RX ORDER — GABAPENTIN 100 MG/1
CAPSULE ORAL
Qty: 120 CAPSULE | Refills: 1 | Status: SHIPPED | OUTPATIENT
Start: 2019-09-03 | End: 2019-09-27

## 2019-09-03 RX ORDER — HYDROXYZINE HYDROCHLORIDE 25 MG/1
25-50 TABLET, FILM COATED ORAL
Qty: 30 TABLET | Refills: 0 | Status: SHIPPED | OUTPATIENT
Start: 2019-09-03 | End: 2019-09-27

## 2019-09-03 ASSESSMENT — MIFFLIN-ST. JEOR: SCORE: 1444.05

## 2019-09-03 NOTE — PROGRESS NOTES
SUBJECTIVE:   CC: Kimberly Rankin is an 49 year old woman who presents for preventive health visit.     Healthy Habits:    Do you get at least three servings of calcium containing foods daily (dairy, green leafy vegetables, etc.)? yes    Amount of exercise or daily activities, outside of work: none    Problems taking medications regularly No    Medication side effects: No    Have you had an eye exam in the past two years? yes    Do you see a dentist twice per year? yes    Do you have sleep apnea, excessive snoring or daytime drowsiness?no    Patient with recent history of meniscus repair in July by Dr. Barton at [facility].   Over the past 2 weeks she has been experiencing a burning sensation around the surgical site and in her right foot, she has noted some discoloration of the right foot compared to the left.  Over the past 1-2 weeks she has developed a constant burning sensation to her arms and chest bilaterally.  She was seen in the ED for this on 08/23/2019.  Labs were WNL and it was suggested to her that this could be secondary to anxiety.    Is taking Hydroxyzine, 25 mg - 1 tablet at bedtime  Right foot with burning sensation, mostly in right foot.    Started Gabapentin, 100 mg 3 times daily.  Is feeling fatigued at night, no fatigue during the day.  Doesn't typically like to take medications.    Has an appointment on 9/23/19 with neurology.   Repeat MRI done with Dr. Barton and she was told everything looked ok.      She has an appointment scheduled with a therapist.  Has been feeling increased stress.      Today's PHQ-2 Score:   PHQ-2 ( 1999 Pfizer) 7/10/2019   Q1: Little interest or pleasure in doing things 0   Q2: Feeling down, depressed or hopeless 0   PHQ-2 Score 0       Abuse: Current or Past(Physical, Sexual or Emotional)- No  Do you feel safe in your environment? Yes    Social History     Tobacco Use     Smoking status: Never Smoker     Smokeless tobacco: Never Used   Substance Use Topics     Alcohol  use: Yes     Frequency: Monthly or less     Drinks per session: 1 or 2     If you drink alcohol do you typically have >3 drinks per day or >7 drinks per week? No                     Reviewed orders with patient.  Reviewed health maintenance and updated orders accordingly - Yes  BP Readings from Last 3 Encounters:   09/03/19 108/72   08/26/19 118/72   08/23/19 115/70    Wt Readings from Last 3 Encounters:   09/03/19 79.8 kg (176 lb)   08/26/19 79.8 kg (176 lb)   07/10/19 79.8 kg (176 lb)                  Patient Active Problem List   Diagnosis     Herpes simplex virus (HSV) infection - genital     Intramural leiomyoma of uterus     Allergic rhinitis     Tear of meniscus of right knee as current injury, unspecified meniscus, unspecified tear type, initial encounter     Overweight (BMI 25.0-29.9)     History of adverse effect of anesthesia     No past surgical history on file.    Social History     Tobacco Use     Smoking status: Never Smoker     Smokeless tobacco: Never Used   Substance Use Topics     Alcohol use: Yes     Frequency: Monthly or less     Drinks per session: 1 or 2     Family History   Problem Relation Age of Onset     Heart Disease Father      Dementia Father      Dementia Mother      Kidney Disease Daughter          Current Outpatient Medications   Medication Sig Dispense Refill     gabapentin (NEURONTIN) 100 MG capsule Take 1 capsule (100 mg) by mouth in the morning, 1 capsule (100 mg) by mouth at lunch time and 2 capsules (200 mg) by mouth at bedtime 120 capsule 1     hydrOXYzine (ATARAX) 25 MG tablet Take 1-2 tablets (25-50 mg) by mouth nightly as needed for anxiety (insomnia) 30 tablet 0     acyclovir (ZOVIRAX) 400 MG tablet Take 400 mg by mouth       cholecalciferol (VITAMIN D-1000 MAX ST) 1000 units TABS Take 1 tablet by mouth every 24 hours       fish oil-omega-3 fatty acids 1000 MG capsule Take 2 g by mouth daily       multivitamin w/minerals (MULTI-VITAMIN) tablet Take 1 tablet by mouth daily        Probiotic Product (PROBIOTIC-10 PO)          Mammogram Screening: Patient under age 50, mutual decision reflected in health maintenance.      Pertinent mammograms are reviewed under the imaging tab.  History of abnormal Pap smear: NO - age 30-65 PAP every 5 years with negative HPV co-testing recommended  Pap smears done with OB/GYN.         Reviewed and updated as needed this visit by clinical staff  Allergies  Meds         Reviewed and updated as needed this visit by Provider        Past Medical History:   Diagnosis Date     Abnormal involuntary movement 9/29/2009    Overview:  LW Modifier:  left shoulder and thigh LW Onset:  9/1/2009 ; Fasciculations Benign     Genital herpes     taking acyclovir regulary as a suppressive medicatino     Otitis media     Patient reports having this a recurrent problem with colds     Seasonal allergies     Dogs and Cats     Temporomandibular joint disorder (TMJ) 12/16/2009    Overview:  LW Modifier:  eval at [facility] LW Onset:  2009 ; Temporomandibular Joint Syndrome      No past surgical history on file.    ROS:  CONSTITUTIONAL: NEGATIVE for fever, chills, change in weight  INTEGUMENTARU/SKIN: NEGATIVE for worrisome rashes, moles or lesions  EYES: NEGATIVE for vision changes or irritation  ENT: NEGATIVE for ear, mouth and throat problems  RESP: NEGATIVE for significant cough or SOB  BREAST: NEGATIVE for masses, tenderness or discharge  CV: NEGATIVE for chest pain, palpitations or peripheral edema   GI: NEGATIVE for nausea, abdominal pain, heartburn, or change in bowel habits  : NEGATIVE for unusual urinary or vaginal symptoms. Periods are regular.  MUSCULOSKELETAL: NEGATIVE for significant arthralgias or myalgia  NEURO: NEGATIVE for weakness, dizziness, POSITIVE for burning sensation  PSYCHIATRIC: NEGATIVE for changes in mood or affect    OBJECTIVE:   /72 (BP Location: Right arm, Patient Position: Sitting, Cuff Size: Adult Regular)   Pulse 94   Temp 98.1  F (36.7  " C) (Oral)   Ht 1.683 m (5' 6.25\")   Wt 79.8 kg (176 lb)   LMP 08/26/2019   SpO2 98%   BMI 28.19 kg/m    EXAM:  GENERAL: healthy, alert and no distress  EYES: Eyes grossly normal to inspection, PERRL and conjunctivae and sclerae normal  HENT: ear canals and TM's normal, nose and mouth without ulcers or lesions  NECK: no adenopathy, no asymmetry, masses, or scars and thyroid normal to palpation  RESP: lungs clear to auscultation - no rales, rhonchi or wheezes  CV: regular rate and rhythm, normal S1 S2, no S3 or S4, no murmur, click or rub  ABDOMEN: soft, nontender, no hepatosplenomegaly, no masses and bowel sounds normal  MS: no gross musculoskeletal defects noted, no edema  SKIN: no suspicious lesions or rashes  NEURO: Normal strength and tone, mentation intact and speech normal  PSYCH: mentation appears normal, affect normal/bright      ASSESSMENT/PLAN:     Kimberly was seen today for physical.    Diagnoses and all orders for this visit:    Routine general medical examination at a health care facility  -     *MA Screening Digital Bilateral; Future    Burning sensation  Obtained additional labs at today's visit.  Consultation with neurology scheduled for 9/23/19.  Recommended trial of increased dose of Gabapentin at bedtime to aid with insomnia and increased burning sensation in right LE while she is sleeping.  May continue with as needed hydroxyzine for insomnia.    -     Vitamin B12  -     TSH with free T4 reflex  -     Magnesium  -     gabapentin (NEURONTIN) 100 MG capsule; Take 1 capsule (100 mg) by mouth in the morning, 1 capsule (100 mg) by mouth at lunch time and 2 capsules (200 mg) by mouth at bedtime  -     hydrOXYzine (ATARAX) 25 MG tablet; Take 1-2 tablets (25-50 mg) by mouth nightly as needed for anxiety (insomnia)    Screening for lipid disorders  -     Lipid panel reflex to direct LDL Fasting          COUNSELING:   Reviewed preventive health counseling, as reflected in patient instructions       " "Regular exercise       Healthy diet/nutrition    Estimated body mass index is 28.19 kg/m  as calculated from the following:    Height as of this encounter: 1.683 m (5' 6.25\").    Weight as of this encounter: 79.8 kg (176 lb).    Weight management plan: Discussed healthy diet and exercise guidelines     reports that she has never smoked. She has never used smokeless tobacco.      Counseling Resources:  ATP IV Guidelines  Pooled Cohorts Equation Calculator  Breast Cancer Risk Calculator  FRAX Risk Assessment  ICSI Preventive Guidelines  Dietary Guidelines for Americans, 2010  USDA's MyPlate  ASA Prophylaxis  Lung CA Screening    Shannon Rivera, MAC [facility] [place]  "

## 2019-09-03 NOTE — RESULT ENCOUNTER NOTE
Dear Kimberly,     -LDL(bad) cholesterol level is elevated which can increase your heart disease risk.  A diet high in fat and simple carbohydrates, genetics and being overweight can contribute to this. ADVISE: exercising 150 minutes of aerobic exercise per week (30 minutes for 5 days per week or 50 minutes for 3 days per week are options) and eating a low saturated fat/low carbohydrate diet are helpful to improve this. In 12 months, you should recheck your fasting cholesterol panel.    -TSH (thyroid stimulating hormone) level is normal which indicates normal thyroid function.  -Magnesium level is normal.        Please send a [employer] message or call [phone]  if you have any questions.      Shannon Rivera, APRN, CNP  [place] [facility] [place]    If you have further questions about the interpretation of your labs, [email or web address] is a good website to check out for further information.

## 2019-09-05 NOTE — RESULT ENCOUNTER NOTE
Dear Kimberly,     Vitamin B12 was above normal range.  Are you currently supplementing with Vitamin B12?      Your liver function was normal.  There was only a slight decrease in your protein and alkaline phosphatase level, which isn't currently concerning.  We can recheck this in 1-2 months.        Please send a [employer] message or call [phone]  if you have any questions.      Shannon Rivera, MAC, CNP  [place] [facility] [place]    If you have further questions about the interpretation of your labs, [email or web address] is a good website to check out for further information.

## 2019-09-27 ENCOUNTER — OFFICE VISIT (OUTPATIENT)
Dept: OBGYN | Facility: CLINIC | Age: 49
End: 2019-09-27
Payer: COMMERCIAL

## 2019-09-27 VITALS
WEIGHT: 166 LBS | DIASTOLIC BLOOD PRESSURE: 70 MMHG | SYSTOLIC BLOOD PRESSURE: 102 MMHG | BODY MASS INDEX: 26.68 KG/M2 | HEIGHT: 66 IN

## 2019-09-27 DIAGNOSIS — Z12.4 SCREENING FOR CERVICAL CANCER: ICD-10-CM

## 2019-09-27 DIAGNOSIS — N85.2 UTERINE ENLARGEMENT: ICD-10-CM

## 2019-09-27 DIAGNOSIS — B00.9 HSV-2 INFECTION: ICD-10-CM

## 2019-09-27 DIAGNOSIS — Z01.419 ENCOUNTER FOR GYNECOLOGICAL EXAMINATION WITHOUT ABNORMAL FINDING: Primary | ICD-10-CM

## 2019-09-27 PROCEDURE — 87624 HPV HI-RISK TYP POOLED RSLT: CPT | Performed by: OBSTETRICS & GYNECOLOGY

## 2019-09-27 PROCEDURE — 99396 PREV VISIT EST AGE 40-64: CPT | Performed by: OBSTETRICS & GYNECOLOGY

## 2019-09-27 PROCEDURE — G0145 SCR C/V CYTO,THINLAYER,RESCR: HCPCS | Performed by: OBSTETRICS & GYNECOLOGY

## 2019-09-27 PROCEDURE — G0124 SCREEN C/V THIN LAYER BY MD: HCPCS | Performed by: OBSTETRICS & GYNECOLOGY

## 2019-09-27 RX ORDER — VALACYCLOVIR HYDROCHLORIDE 500 MG/1
500 TABLET, FILM COATED ORAL DAILY
Qty: 90 TABLET | Refills: 3 | Status: SHIPPED | OUTPATIENT
Start: 2019-09-27 | End: 2020-09-28

## 2019-09-27 ASSESSMENT — MIFFLIN-ST. JEOR: SCORE: 1398.69

## 2019-09-27 NOTE — PROGRESS NOTES
SUBJECTIVE:   CC: Kimberly Rankin is an 49 year old woman who presents for preventive health visit.     Healthy Habits:    Do you get at least three servings of calcium containing foods daily (dairy, green leafy vegetables, etc.)? yes    Amount of exercise or daily activities, outside of work: 5 hour(s) per week    Problems taking medications regularly No    Medication side effects: No    Have you had an eye exam in the past two years? yes    Do you see a dentist twice per year? yes    Do you have sleep apnea, excessive snoring or daytime drowsiness?no      Pt of mine from [facility] who has moved over to see me here.  Needs Pap/HPV today.  Mammogram at end of year.  Also has Hx a small 1.5 cm myoma on U/S 6/2016.  Needs Rx Valtrex for HSV.  Had colonoscopy 12/24/18 at [facility] showing a sessile adenoma.    Today's PHQ-2 Score:   PHQ-2 ( 1999 Pfizer) 9/27/2019 7/10/2019   Q1: Little interest or pleasure in doing things 0 0   Q2: Feeling down, depressed or hopeless 0 0   PHQ-2 Score 0 0       Abuse: Current or Past(Physical, Sexual or Emotional)- No  Do you feel safe in your environment? Yes    Social History     Tobacco Use     Smoking status: Never Smoker     Smokeless tobacco: Never Used   Substance Use Topics     Alcohol use: Yes     Frequency: Monthly or less     Drinks per session: 1 or 2     Comment: Occasionally     If you drink alcohol do you typically have >3 drinks per day or >7 drinks per week? No                     Reviewed orders with patient.  Reviewed health maintenance and updated orders accordingly - Yes  Current Outpatient Medications   Medication Sig Dispense Refill     acyclovir (ZOVIRAX) 400 MG tablet Take 400 mg by mouth       cholecalciferol (VITAMIN D-1000 MAX ST) 1000 units TABS Take 1 tablet by mouth every 24 hours       fish oil-omega-3 fatty acids 1000 MG capsule Take 2 g by mouth daily       multivitamin w/minerals (MULTI-VITAMIN) tablet Take 1 tablet by mouth daily        Probiotic Product (PROBIOTIC-10 PO)        valACYclovir (VALTREX) 500 MG tablet Take 1 tablet (500 mg) by mouth daily 90 tablet 3     No Known Allergies    Mammogram Screening: Patient under age 50, mutual decision reflected in health maintenance.      Pertinent mammograms are reviewed under the imaging tab.  History of abnormal Pap smear: NO - age 30- 65 PAP every 3 years recommended     Reviewed and updated as needed this visit by clinical staff  Tobacco  Allergies  Meds  Med Hx  Surg Hx  Fam Hx  Soc Hx        Reviewed and updated as needed this visit by Provider  Tobacco  Allergies  Meds  Med Hx  Surg Hx  Fam Hx  Soc Hx       Past Medical History:   Diagnosis Date     Abnormal involuntary movement 2009    Overview:  LW Modifier:  left shoulder and thigh LW Onset:  2009 ; Fasciculations Benign     Genital herpes     taking acyclovir regulary as a suppressive medicatino     HSV-2 infection      Otitis media     Patient reports having this a recurrent problem with colds     Seasonal allergies     Dogs and Cats     Temporomandibular joint disorder (TMJ) 2009    Overview:  LW Modifier:  eval at [facility] LW Onset:   ; Temporomandibular Joint Syndrome      Past Surgical History:   Procedure Laterality Date     ARTHROSCOPY KNEE WITH MENISCAL REPAIR Left 2019     COLONOSCOPY  2018    Sessile adenomatous polyp removed - Colonscopy Q 5 years     OB History    Para Term  AB Living   2 2 2 0 0 2   SAB TAB Ectopic Multiple Live Births   0 0 0 0 2      # Outcome Date GA Lbr Nghia/2nd Weight Sex Delivery Anes PTL Lv   2 Term 02 40w0d  3.515 kg (7 lb 12 oz) F Vag-Spont EPI N ANASTASIA   1 Term 00 39w0d  3.147 kg (6 lb 15 oz) M Vag-Spont EPI N ANASTASIA       ROS:  CONSTITUTIONAL: NEGATIVE for fever, chills, change in weight  INTEGUMENTARU/SKIN: NEGATIVE for worrisome rashes, moles or lesions  EYES: NEGATIVE for vision changes or irritation  ENT: NEGATIVE for ear, mouth and throat  "problems  RESP: NEGATIVE for significant cough or SOB  BREAST: NEGATIVE for masses, tenderness or discharge  CV: NEGATIVE for chest pain, palpitations or peripheral edema  GI: NEGATIVE for nausea, abdominal pain, heartburn, or change in bowel habits  : NEGATIVE for unusual urinary or vaginal symptoms. Periods are regular.  MUSCULOSKELETAL: NEGATIVE for significant arthralgias or myalgia  NEURO: NEGATIVE for weakness, dizziness or paresthesias  PSYCHIATRIC: NEGATIVE for changes in mood or affect    OBJECTIVE:   /70 (BP Location: Left arm, Cuff Size: Adult Regular)   Ht 1.683 m (5' 6.25\")   Wt 75.3 kg (166 lb)   LMP 09/27/2019 (Exact Date)   BMI 26.59 kg/m    EXAM:  GENERAL: healthy, alert and no distress  EYES: Eyes grossly normal to inspection, PERRL and conjunctivae and sclerae normal  HENT: ear canals and TM's normal, nose and mouth without ulcers or lesions  NECK: no adenopathy, no asymmetry, masses, or scars and thyroid normal to palpation  RESP: lungs clear to auscultation - no rales, rhonchi or wheezes  BREAST: normal without masses, tenderness or nipple discharge and no palpable axillary masses or adenopathy  CV: regular rate and rhythm, normal S1 S2, no S3 or S4, no murmur, click or rub, no peripheral edema and peripheral pulses strong  ABDOMEN: soft, nontender, no hepatosplenomegaly, no masses and bowel sounds normal   (female): normal female external genitalia, normal urethral meatus, vaginal mucosa pink, moist, well rugated, and normal cervix/adnexa without masses or discharge.  Uterus 8 weeks size with retroversion vs possible 4-5 cm posterior myoma, non-tender  MS: no gross musculoskeletal defects noted, no edema  SKIN: no suspicious lesions or rashes  NEURO: Normal strength and tone, mentation intact and speech normal  PSYCH: mentation appears normal, affect normal/bright    Diagnostic Test Results:  none     ASSESSMENT/PLAN:       ICD-10-CM    1. Encounter for gynecological examination " "without abnormal finding Z01.419    2. HSV-2 infection B00.9 valACYclovir (VALTREX) 500 MG tablet   3. Screening for cervical cancer Z12.4 Pap imaged thin layer screen with HPV - recommended age 30 - 65 years (select HPV order below)     HPV High Risk Types DNA Cervical   4. Uterine enlargement N85.2 US Pelvic Complete with Transvaginal     Pelvic U/S to assess for possible myoma enlargement.  Rx Valtrex 500 mg daily for suppression.  Pap/HPV done.  If all neg, next due 2022.  Pt needs colonoscopies Q 5 years, next due 2023.  Mammogram at end of year (last 12/2018).    COUNSELING:   Reviewed preventive health counseling, as reflected in patient instructions       Colon cancer screening    Estimated body mass index is 26.59 kg/m  as calculated from the following:    Height as of this encounter: 1.683 m (5' 6.25\").    Weight as of this encounter: 75.3 kg (166 lb).    Weight management plan: Discussed healthy diet and exercise guidelines     reports that she has never smoked. She has never used smokeless tobacco.      Counseling Resources:  ATP IV Guidelines  Pooled Cohorts Equation Calculator  Breast Cancer Risk Calculator  FRAX Risk Assessment  ICSI Preventive Guidelines  Dietary Guidelines for Americans, 2010  USDA's MyPlate  ASA Prophylaxis  Lung CA Screening    Roshan Oshea MD  [facility]  "

## 2019-09-27 NOTE — NURSING NOTE
"Chief Complaint   Patient presents with     Physical       Initial /70 (BP Location: Left arm, Cuff Size: Adult Regular)   Ht 1.683 m (5' 6.25\")   Wt 75.3 kg (166 lb)   LMP 2019 (Exact Date)   BMI 26.59 kg/m   Estimated body mass index is 26.59 kg/m  as calculated from the following:    Height as of this encounter: 1.683 m (5' 6.25\").    Weight as of this encounter: 75.3 kg (166 lb).  BP completed using cuff size: regular    Questioned patient about current smoking habits.  Pt. has never smoked.          The following HM Due: pap smear      Tobin Gerber CMA    "

## 2019-10-03 LAB
COPATH REPORT: NORMAL
PAP: NORMAL

## 2019-10-07 LAB
FINAL DIAGNOSIS: NORMAL
HPV HR 12 DNA CVX QL NAA+PROBE: NEGATIVE
HPV16 DNA SPEC QL NAA+PROBE: NEGATIVE
HPV18 DNA SPEC QL NAA+PROBE: NEGATIVE
SPECIMEN DESCRIPTION: NORMAL
SPECIMEN SOURCE CVX/VAG CYTO: NORMAL

## 2019-10-18 ENCOUNTER — ANCILLARY PROCEDURE (OUTPATIENT)
Dept: ULTRASOUND IMAGING | Facility: CLINIC | Age: 49
End: 2019-10-18
Attending: OBSTETRICS & GYNECOLOGY
Payer: COMMERCIAL

## 2019-10-18 DIAGNOSIS — N85.2 UTERINE ENLARGEMENT: ICD-10-CM

## 2019-10-18 PROCEDURE — 76830 TRANSVAGINAL US NON-OB: CPT | Performed by: OBSTETRICS & GYNECOLOGY

## 2019-10-18 PROCEDURE — 76856 US EXAM PELVIC COMPLETE: CPT | Performed by: OBSTETRICS & GYNECOLOGY

## 2019-10-21 DIAGNOSIS — N83.201 RIGHT OVARIAN CYST: Primary | ICD-10-CM

## 2019-10-21 DIAGNOSIS — D25.9 FIBROID UTERUS: ICD-10-CM

## 2019-11-03 ENCOUNTER — HEALTH MAINTENANCE LETTER (OUTPATIENT)
Age: 49
End: 2019-11-03

## 2020-02-17 ENCOUNTER — OFFICE VISIT (OUTPATIENT)
Dept: FAMILY MEDICINE | Facility: CLINIC | Age: 50
End: 2020-02-17
Payer: COMMERCIAL

## 2020-02-17 VITALS
OXYGEN SATURATION: 100 % | DIASTOLIC BLOOD PRESSURE: 76 MMHG | WEIGHT: 171 LBS | HEART RATE: 81 BPM | SYSTOLIC BLOOD PRESSURE: 112 MMHG | HEIGHT: 66 IN | TEMPERATURE: 97.9 F | BODY MASS INDEX: 27.48 KG/M2

## 2020-02-17 DIAGNOSIS — F41.9 ANXIETY: ICD-10-CM

## 2020-02-17 DIAGNOSIS — R52 PAIN: Primary | ICD-10-CM

## 2020-02-17 LAB
ERYTHROCYTE [DISTWIDTH] IN BLOOD BY AUTOMATED COUNT: 14.7 % (ref 10–15)
ERYTHROCYTE [SEDIMENTATION RATE] IN BLOOD BY WESTERGREN METHOD: 13 MM/H (ref 0–20)
HCT VFR BLD AUTO: 39.9 % (ref 35–47)
HGB BLD-MCNC: 13.2 G/DL (ref 11.7–15.7)
MCH RBC QN AUTO: 30.3 PG (ref 26.5–33)
MCHC RBC AUTO-ENTMCNC: 33.1 G/DL (ref 31.5–36.5)
MCV RBC AUTO: 92 FL (ref 78–100)
PLATELET # BLD AUTO: 265 10E9/L (ref 150–450)
RBC # BLD AUTO: 4.35 10E12/L (ref 3.8–5.2)
WBC # BLD AUTO: 5.1 10E9/L (ref 4–11)

## 2020-02-17 PROCEDURE — 99214 OFFICE O/P EST MOD 30 MIN: CPT | Performed by: FAMILY MEDICINE

## 2020-02-17 PROCEDURE — 86038 ANTINUCLEAR ANTIBODIES: CPT | Performed by: FAMILY MEDICINE

## 2020-02-17 PROCEDURE — 86140 C-REACTIVE PROTEIN: CPT | Performed by: FAMILY MEDICINE

## 2020-02-17 PROCEDURE — 36415 COLL VENOUS BLD VENIPUNCTURE: CPT | Performed by: FAMILY MEDICINE

## 2020-02-17 PROCEDURE — 85027 COMPLETE CBC AUTOMATED: CPT | Performed by: FAMILY MEDICINE

## 2020-02-17 PROCEDURE — 80053 COMPREHEN METABOLIC PANEL: CPT | Performed by: FAMILY MEDICINE

## 2020-02-17 PROCEDURE — 85652 RBC SED RATE AUTOMATED: CPT | Performed by: FAMILY MEDICINE

## 2020-02-17 ASSESSMENT — ANXIETY QUESTIONNAIRES
6. BECOMING EASILY ANNOYED OR IRRITABLE: NEARLY EVERY DAY
1. FEELING NERVOUS, ANXIOUS, OR ON EDGE: NEARLY EVERY DAY
IF YOU CHECKED OFF ANY PROBLEMS ON THIS QUESTIONNAIRE, HOW DIFFICULT HAVE THESE PROBLEMS MADE IT FOR YOU TO DO YOUR WORK, TAKE CARE OF THINGS AT HOME, OR GET ALONG WITH OTHER PEOPLE: VERY DIFFICULT
GAD7 TOTAL SCORE: 18
3. WORRYING TOO MUCH ABOUT DIFFERENT THINGS: NEARLY EVERY DAY
5. BEING SO RESTLESS THAT IT IS HARD TO SIT STILL: MORE THAN HALF THE DAYS
7. FEELING AFRAID AS IF SOMETHING AWFUL MIGHT HAPPEN: NEARLY EVERY DAY
2. NOT BEING ABLE TO STOP OR CONTROL WORRYING: MORE THAN HALF THE DAYS

## 2020-02-17 ASSESSMENT — PATIENT HEALTH QUESTIONNAIRE - PHQ9
5. POOR APPETITE OR OVEREATING: MORE THAN HALF THE DAYS
SUM OF ALL RESPONSES TO PHQ QUESTIONS 1-9: 15

## 2020-02-17 ASSESSMENT — MIFFLIN-ST. JEOR: SCORE: 1421.37

## 2020-02-17 NOTE — PROGRESS NOTES
"Subjective     Kimberly Rankin is a 49 year old female who presents to clinic today for the following health issues:    HPI   Concern - multiple symptoms  Onset: 5 weeks    Description:   Headaches, eyes and muscles around feel \"weird\" - went to eye doctor and was told it was dry eyes. Advised eye drops. She feels like the muscles around her eyes \"aren't working right, aren't moving right\"    Intensity: moderate    Progression of Symptoms:  same    Accompanying Signs & Symptoms:  Body aches/random pains in body - symptoms will last from 15-90 seconds and resolves spontaneously. Then she will feel it again in another part of her body. Sometimes notices numbness/tingling in hands or feet. May have noticed some change in strength.     Previous history of similar problem:   none    Precipitating factors:   Worsened by: none    Alleviating factors:  Improved by: none    Therapies Tried and outcome: Advil    She had cervical spine MRI through [facility] which demonstrated small syrinx in the cervical spinal cord.  She has been dealing with cervical spine pain.    The symptoms have actually been going on longer than 5 weeks.  She was seen in the emergency department on 8/23/2019 for generalized body aches and a burning sensation across her entire body.  She had noticed that she was diagnosed with reflex sympathetic dystrophy and right lower extremity, however unlikely that this be spreading to different parts of body.  No life threatening etiology was suspected and so she was advised to follow-up with PCP.  She was sent home with hydroxyzine to treat any possible underlying anxiety and also gabapentin to see if that will help with burning sensation.  She states that she has not been taking either of those medications because she had started to feel better so stopped them.    Patient Active Problem List   Diagnosis     Herpes simplex virus (HSV) infection - genital     Intramural leiomyoma of uterus     Allergic rhinitis " "    Tear of meniscus of right knee as current injury, unspecified meniscus, unspecified tear type, initial encounter     Overweight (BMI 25.0-29.9)     History of adverse effect of anesthesia     HSV-2 infection     Screening for cervical cancer     Right ovarian cyst     Past Surgical History:   Procedure Laterality Date     ARTHROSCOPY KNEE WITH MENISCAL REPAIR Left 07/2019     COLONOSCOPY  12/24/2018    Sessile adenomatous polyp removed - Colonscopy Q 5 years     HYSTEROSCOPY DIAGNOSTIC  11/09/2016    Office hysteroscopy at [facility] - Normal cavity       Social History     Tobacco Use     Smoking status: Never Smoker     Smokeless tobacco: Never Used   Substance Use Topics     Alcohol use: Yes     Frequency: Monthly or less     Drinks per session: 1 or 2     Comment: Occasionally     Family History   Problem Relation Age of Onset     Heart Disease Father      Dementia Father      Dementia Mother      Kidney Disease Daughter            Reviewed and updated as needed this visit by Provider  Tobacco  Allergies  Meds  Problems  Med Hx  Surg Hx  Fam Hx         Review of Systems   ROS COMP: Constitutional, HEENT, cardiovascular, pulmonary, gi and gu systems are negative, except as otherwise noted.      Objective    /76   Pulse 81   Temp 97.9  F (36.6  C) (Oral)   Ht 1.683 m (5' 6.25\")   Wt 77.6 kg (171 lb)   SpO2 100%   BMI 27.39 kg/m    Body mass index is 27.39 kg/m .  Physical Exam   GENERAL: healthy, alert and no distress  RESP: lungs clear to auscultation - no rales, rhonchi or wheezes  CV: regular rate and rhythm, normal S1 S2, no S3 or S4, no murmur, click or rub, no peripheral edema and peripheral pulses strong  NEURO: Normal strength and tone, sensory exam grossly normal, mentation intact, oriented times 3, speech normal, cranial nerves 2-12 intact and DTR's normal and symmetric    Diagnostic Test Results:  Labs reviewed in Epic        Assessment & Plan     1. Pain: Unclear etiology.  No " focal neurologic abnormalities on exam today.  Will check some baseline labs.  Given syrinx and current migratory pain, will refer to neurology to evaluate for underlying neurologic abnormality. Consider autoimmune etiology as well.   - Comprehensive metabolic panel (BMP + Alb, Alk Phos, ALT, AST, Total. Bili, TP)  - CRP, inflammation  - ESR: Erythrocyte sedimentation rate  - Anti Nuclear Yandy IgG by IFA with Reflex  - CBC with platelets    2. Anxiety: I do wonder if underlying anxiety is contributing to her symptoms.  She does note that her daughter recently tried to commit suicide and as such she was actually in the emergency department last night with her.  Even if this is not a root cause for her symptoms it is likely not helped.  We will further evaluate current symptoms and then address symptoms of anxiety if still present.         Return in about 2 weeks (around 3/2/2020) for follow up pain, anxiety.    Aj Olson,   [facility]

## 2020-02-18 LAB
ALBUMIN SERPL-MCNC: 3.8 G/DL (ref 3.4–5)
ALP SERPL-CCNC: 44 U/L (ref 40–150)
ALT SERPL W P-5'-P-CCNC: 18 U/L (ref 0–50)
ANION GAP SERPL CALCULATED.3IONS-SCNC: 7 MMOL/L (ref 3–14)
AST SERPL W P-5'-P-CCNC: 13 U/L (ref 0–45)
BILIRUB SERPL-MCNC: 0.2 MG/DL (ref 0.2–1.3)
BUN SERPL-MCNC: 8 MG/DL (ref 7–30)
CALCIUM SERPL-MCNC: 8.8 MG/DL (ref 8.5–10.1)
CHLORIDE SERPL-SCNC: 106 MMOL/L (ref 94–109)
CO2 SERPL-SCNC: 26 MMOL/L (ref 20–32)
CREAT SERPL-MCNC: 0.54 MG/DL (ref 0.52–1.04)
CRP SERPL-MCNC: <2.9 MG/L (ref 0–8)
GFR SERPL CREATININE-BSD FRML MDRD: >90 ML/MIN/{1.73_M2}
GLUCOSE SERPL-MCNC: 90 MG/DL (ref 70–99)
POTASSIUM SERPL-SCNC: 3.7 MMOL/L (ref 3.4–5.3)
PROT SERPL-MCNC: 7.8 G/DL (ref 6.8–8.8)
SODIUM SERPL-SCNC: 139 MMOL/L (ref 133–144)

## 2020-02-18 ASSESSMENT — ANXIETY QUESTIONNAIRES: GAD7 TOTAL SCORE: 18

## 2020-02-19 LAB — ANA SER QL IF: NEGATIVE

## 2020-02-21 ENCOUNTER — TRANSFERRED RECORDS (OUTPATIENT)
Dept: HEALTH INFORMATION MANAGEMENT | Facility: CLINIC | Age: 50
End: 2020-02-21

## 2020-03-03 ENCOUNTER — ANCILLARY PROCEDURE (OUTPATIENT)
Dept: MAMMOGRAPHY | Facility: CLINIC | Age: 50
End: 2020-03-03
Payer: COMMERCIAL

## 2020-03-03 DIAGNOSIS — Z00.00 ROUTINE GENERAL MEDICAL EXAMINATION AT A HEALTH CARE FACILITY: ICD-10-CM

## 2020-03-03 PROCEDURE — 77063 BREAST TOMOSYNTHESIS BI: CPT | Mod: TC

## 2020-03-03 PROCEDURE — 77067 SCR MAMMO BI INCL CAD: CPT | Mod: TC

## 2020-03-05 NOTE — RESULT ENCOUNTER NOTE
Dear Kimberly,     -Mammogram was normal.  ADVISE: rechecking in 1 year.      Please send a [employer] message or call [phone]  if you have any questions.      MAC Fry, CNP  [facility]    If you have further questions about the interpretation of your labs, [email or web address] is a good website to check out for further information.

## 2020-03-18 ENCOUNTER — TELEPHONE (OUTPATIENT)
Dept: FAMILY MEDICINE | Facility: CLINIC | Age: 50
End: 2020-03-18

## 2020-03-18 DIAGNOSIS — R52 PAIN: ICD-10-CM

## 2020-03-18 DIAGNOSIS — F41.9 ANXIETY: Primary | ICD-10-CM

## 2020-03-18 RX ORDER — DULOXETIN HYDROCHLORIDE 30 MG/1
30 CAPSULE, DELAYED RELEASE ORAL DAILY
Qty: 90 CAPSULE | Refills: 0 | Status: SHIPPED | OUTPATIENT
Start: 2020-03-18 | End: 2020-04-20 | Stop reason: SINTOL

## 2020-03-18 NOTE — TELEPHONE ENCOUNTER
Reason for Call:  Other appointment    Detailed comments: Kimberly is calling, stating that she believes she has complex regional pain syndrome. Would like to know what steps she should take.     Phone Number Patient can be reached at: Home number on file [phone]    Best Time:     Can we leave a detailed message on this number? YES    Call taken on 3/18/2020 at 3:32 PM by Leslye Yip

## 2020-03-18 NOTE — TELEPHONE ENCOUNTER
Rx for Cymbalta sent to pharmacy. We should follow up in 1 month to see how she is feeling.    Aj Olson,   3/18/2020 12:19 PM

## 2020-03-18 NOTE — TELEPHONE ENCOUNTER
When I saw her, she was having migratory aches and pains in her body which I don't think would be consistent with complex regional pain syndrome.  I know she saw neurology.From their note it sounds like she going to meet with her surgeon and if they didn't have a plan, she was going to follow up with neurology for an EMG. I'm not sure if she has seen her surgeon or followed back up with neurology but those would be the next steps.    Aj Olson,   3/18/2020 4:19 PM

## 2020-03-18 NOTE — TELEPHONE ENCOUNTER
Reason for Call:  Medication or medication refill:    Do you use a [facility]?  Name of the pharmacy and phone number for the current request:     [facility] [address] [place] [address] - [address]      Name of the medication requested: Cymbalta     Other request: Kimberly has discussed with Wesley at visit on 2/17/2020 about possibility of starting cymbalta. At the time she declined but would now like the rx sent to her pharmacy.     Can we leave a detailed message on this number? YES    Phone number patient can be reached at: Cell number on file:    Telephone Information:   Mobile [phone]       Best Time: anytime     Call taken on 3/18/2020 at 10:52 AM by Leslye Yip

## 2020-03-18 NOTE — TELEPHONE ENCOUNTER
Kimberly returned call. Informed patient rx is at pharmacy and to follow up via phone or e-visit in one month.

## 2020-03-19 NOTE — TELEPHONE ENCOUNTER
Please advise patient that Dr. Olson has reviewed her message and advises that if she has not done so already to follow up with Neurology. Thank you.  SAMY ContrerasN, RN  [facility]

## 2020-03-25 ENCOUNTER — TELEPHONE (OUTPATIENT)
Dept: FAMILY MEDICINE | Facility: CLINIC | Age: 50
End: 2020-03-25

## 2020-03-25 DIAGNOSIS — R52 MIGRATORY PAIN: Primary | ICD-10-CM

## 2020-03-25 NOTE — TELEPHONE ENCOUNTER
Patient calling requesting referral to [facility] pain clinic, or a closer [place] location. Please advise  [phone]  Ok to leave detailed message: yes  Thank you  Yolanda Hanson

## 2020-03-26 ENCOUNTER — E-VISIT (OUTPATIENT)
Dept: FAMILY MEDICINE | Facility: CLINIC | Age: 50
End: 2020-03-26
Payer: COMMERCIAL

## 2020-03-26 ENCOUNTER — TELEPHONE (OUTPATIENT)
Dept: PALLIATIVE MEDICINE | Facility: CLINIC | Age: 50
End: 2020-03-26

## 2020-03-26 DIAGNOSIS — R52 MIGRATORY PAIN: Primary | ICD-10-CM

## 2020-03-26 PROCEDURE — 99422 OL DIG E/M SVC 11-20 MIN: CPT | Performed by: FAMILY MEDICINE

## 2020-03-26 NOTE — TELEPHONE ENCOUNTER
Dr Olson,    Let me know if you need further triage. Looks like you are familiar with her.    Chelsea Becerra RN- Triage [employer]

## 2020-03-26 NOTE — TELEPHONE ENCOUNTER
We re taking every precaution to prevent the spread of COVID-19. Our top priority is to protect and care for our patients.     We are reviewing all new patients to determine if their visit is considered essential. We are balancing helping patients with chronic pain with the safety of our patients and staff.    In review of this upcoming new visit, the determination is:  Non-essential    PCP doesn't feel she has CRPS, and reviewing his note from 2/17 I agree her symptoms are not consistent.    This will be sent to our  staff to adjust schedule as determined by provider.    Smita Cochran MD on 3/26/2020 at 1:05 PM

## 2020-03-26 NOTE — TELEPHONE ENCOUNTER
Not sure if she has followed back up with neurology but would still advise this. Can also place referral to pain clinic.    Aj Olson DO  3/26/2020 12:31 PM

## 2020-03-26 NOTE — TELEPHONE ENCOUNTER
Called pt to inform we are unable to schedule at this time due to covid-19. Informed pt to reach out to her referring provider in the mean time until we are able to schedule.    Patricia MUNOZ    [facility]

## 2020-03-26 NOTE — TELEPHONE ENCOUNTER
Order received from Aj Olson DO at [facility].      Patient is being referred forEvaluation for comprehensive services .      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      Routing to provider pool to determine if this is essential.     Patricia MUNOZ    [facility]

## 2020-04-13 NOTE — TELEPHONE ENCOUNTER
Routing to determine if pt can be schedule for a virtual visit.    Patricia MUNOZ    [facility]

## 2020-04-13 NOTE — TELEPHONE ENCOUNTER
Pain Management Center Referral      1. Confirmed address with patient? Yes  2. Confirmed phone number with patient? Yes  3. Confirmed referring provider? Yes  4. Is the PCP the same as the referring provider? Yes  5. Has the patient been to any previous pain clinics? Yes  (If yes, send HANY with welcome letter)  6. Which insurance are we to bill for this appointment?  [facility]    7. Informed pt of cancellation (48 hour) policy? Yes    REGARDING OPIOID MEDICATIONS: We will always address appropriateness of opioid pain medications, but we generally will not automatically take on a prescribing role. When we do take on prescribing of opioids for chronic pain, it is in collaboration with the referring physician for an intermediate period of time (months), with an expectation that the primary physician or provider will assume the prescribing role if medications are effective at stable doses with demonstrated compliance. Therefore, please do not assume that your prescribing responsibilities end on the day of pain clinic consultation.  8. Informed pt of prescribing policy? Yes    9.Please be aware that once you are established with a pain provider and location, you will need to continue have all future visits with that provider and location. It is best to determine what location is the most convenient for you and schedule with that one.    ** PATIENT INFORMED OF THIS POLICY Yes      9. Referring Provider: Aj Olson DO    10. Criteria for Triage Eval:   -Missed/Failed 1st appointment? N/A     -Medication Focused? N/A     -Mental Health Concerns? (e.g. Recent psych hospitalization/snap shot)? N/A     -Active substance abuse? N/A     -Patient behaviors (e.g. Offensive language/raised voice)? N/A

## 2020-04-13 NOTE — TELEPHONE ENCOUNTER
Pt scheduled 4/14 for new eval. Pt preferred phone visit.    Patricia MUNOZ    [facility]

## 2020-04-17 NOTE — PROGRESS NOTES
"Kimberly Rankin is a 49 year old female who is being evaluated via a billable telephone visit.      The patient has been notified of following:     \"This telephone visit will be conducted via a call between you and your physician/provider. We have found that certain health care needs can be provided without the need for a physical exam.  This service lets us provide the care you need with a short phone conversation.  If a prescription is necessary we can send it directly to your pharmacy.  If lab work is needed we can place an order for that and you can then stop by our lab to have the test done at a later time.    Telephone visits are billed at different rates depending on your insurance coverage. During this emergency period, for some insurers they may be billed the same as an in-person visit.  Please reach out to your insurance provider with any questions.    If during the course of the call the physician/provider feels a telephone visit is not appropriate, you will not be charged for this service.\"    Patient has given verbal consent for Telephone visit?  Yes    How would you like to obtain your AVS? T[employer]art    Additional provider notes:     Subjective:    Chief Complaint:    Pain       Pain history:  Kimberly Rankin is a 49 year old female who presents for initial evaluation of chief complaint of right knee and mid back pain.      She first started having problems with right knee pain in the summer of 2019. She had a right mensicus repair with [facility] in July of 2019. She reports the surgery went well but she pain worsened post operatively. She also notes burning pain \"all over my body\" post operatively as well as a blue calf and foot. She notes discoloration improved over time. She was evaluated in the ED with an exacerbation and started on gabapentin with improvement in symptoms. She felt better so stopped taking gabapentin after a few months. She notes in January the development of nerve " "pain in bilateral hands and feet, as well as headaches and spasms in her feet and legs. Since last summer she reports breathing, swallowing, and urinating difficulties. She thinks these symptoms are related to her pain conditions. She reports return of nerve pain in right foot and ankle in March that has persisted. She is being evaluated by [facility], Dr. Astorga, recently had more imaging completed. She has been diagnosed a thoracic syrinx. She was also recently evaluated by Dr. Pendleton, a spine specialist, with [facility] and is awaiting follow up. She is not interested in medications or injections. She states she has done research on CRPS and is interested in low dose naltrexone, medical cannabis, and physical therapy. The pain is located in her right shin, ankle, and entire foot. Also has pain in bilateral upper back, right> left, no radiation.  Describes sensation of pins and needles with ambulation in the morning, also has numbness and tingling. She also reports generalized weakness.     Pain description:  Location: right shin/ankle/foot and midback  Quality: burning  Severity/Intensity: 0-1/10 at best, 5/10 at worst, 3/10 on average  Aggravating factors include: not taking medication, stress, dietary changes  Relieving factors include: gabapentin     The patient otherwise denies bowel or bladder incontinence, parasthesias, weakness, saddle anesthesia, unintentional weight loss, or fever/chills/sweats.     Pain Treatments:  1. Medications:       Current pain medications:   Gabapentin 100mg BID and 200mg at bedtime- H, hasn't tried higher dosing    Hemp CBD oil- H, \"high quality one\"     Current calculated MME: 0    1. Previous Pain Relevant Medications:  NOTE: This medication information taken from patient's intake form, not medical records.    Opiates: oxycodone- [place], [place], constipation   NSAIDS: Advil- H, doesn't think good for her body so doesn't take    Muscle Relaxants: " no   Anti-migraine mediations: no   Anti-depressants: Cymbalta- [place], fatigue, she didn't want to have trouble coming off, amitriptyline- neurologist prescribed    Sleep aids: no   Anxiolytics: hydroxyzine- [facility] for anxiety/?   Neuropathics: gabapentin- H    Topicals: CBD topical ointment- H   Other medications not covered above: Tylenol- [facility]    2. Physical Therapy: knee post op- H  3. Pain Psychology: no  4. Surgery: [facility] July 2019  5. Injections: no  6. Chiropractic: yes- [facility] for burning pain  7. Acupuncture: yes- W, previously helpful  8. TENS Unit: no    Social History:  Home situation: lives in a house  Support system: , two kids  Occupation/Schooling:  [employer]  Tobacco use: no  Drug use: no  Alcohol use: not currently   History of chemical dependency treatment: no  Mental health admissions: no    Review of Systems:    POSTIVE IN BOLD  GENERAL: fever/chills, fatigue, general unwell feeling, weight gain/loss.  HEAD/EYES:  headache, dizziness, or vision changes.    EARS/NOSE/THROAT: nosebleeds, hearing loss, sinus infection, earache, tinnitus.  IMMUNE:  allergies, cancer, immune deficiency, or infections.  SKIN:  itching, rash, hives  HEME/Lymphatic: anemia, easy bruising, easy bleeding.  RESPIRATORY: cough, wheezing, or shortness of breath  CARDIOVASCULAR/Circulation: extremity edema, syncope, hypertension, tachycardia, or angina.  GASTROINTESTINAL: abdominal pain, nausea/emesis, diarrhea, constipation,  hematochezia, or melena.  ENDOCRINE:  diabetes, steroid use,  thyroid disease or osteoporosis.  MUSCULOSKELETAL: joint pain, stiffness, neck pain, back pain, arthritis, or gout.  GENITOURINARY: frequency, urgency, dysuria, difficulty voiding, hematuria or incontinence.  NEUROLOGIC: weakness, numbness, paresthesias, seizure, tremor, stroke or memory loss (brain fog).  PSYCHIATRIC: depression, anxiety, stress, suicidal thoughts or mood swings.        Assessment:  Kimberly Rankin is a 49 year old female with a past medical history significant for TMJ, seasonal allergies, and HSV-2 who presents with complaints of right foot and mid back pain.     1. Right foot/ankle/calf pain- etiology unclear without physical exam. She does meet some criteria for CRPS or possibly fibromyalgia. We discussed that some of the features of her pain are unusual and not consistent with CRPS or other pain conditions. I recommended she continue investigation with specialists as planned for now.   2. Mid back pain- etiology unclear without records or physical exam.   3. Mental Health - the patient's mental health concerns, specifically situational depression, affect her experience of pain and contribute to her clinically significant distress.    1. Chronic foot pain, right    2. Chronic bilateral thoracic back pain    3. Chronic pain syndrome      Plan:  The following recommendations were given to the patient. Diagnosis, treatment options, risks, benefits, and alternatives were discussed, and all questions were answered. The patient expressed understanding of the plan for management.     I am recommending a multidisciplinary treatment plan to help this patient better manage her pain.  This includes:      1.  Pain Physical Therapy:     YES   Highly recommend this gentler form of physical therapy and she is interested. Order placed today. Call to schedule in about 2 weeks.    2.  Pain Psychologist to address relaxation, behavioral change, coping style, and other factors important to improvement.    YES   Also highly recommend visits with pain psychology and she is interested. Order placed today. Call to schedule in about 2 weeks.    3.  Diagnostic Studies: given unclear origin of pain, recommended she continue evaluation with neurology and orthopedics as planned. If investigation is unrevealing, fibromyalgia is the most likely diagnosis.    4.  Medication Management:     1. Kimberly  is not interested in medications at this point. We did discuss that my recommendations would include an increase in gabapentin or addition of amitriptyline. She would like to continue gabapentin 100g BID and 200mg at bedtime, supplements, and dietary changes.     2. Kimberly is interested in low dose naltrexone. We did discuss that this would be an option but though this chronic pain is a relatively new indication for use of this medication. We can discuss further at next visit.    3. Kimberly is interested in medical cannabis. She has had success with hep CBD oil. We discussed that cannabis may be an option in the future as well but would recommend trying other treatments first.   5.  Consider the purchase of a TENS unit.    6.  Follow up with MAC Cardoza CNP in 4-6 weeks.       Phone call duration: 42 minutes    MAC Henson CNP

## 2020-04-20 ENCOUNTER — VIRTUAL VISIT (OUTPATIENT)
Dept: PALLIATIVE MEDICINE | Facility: CLINIC | Age: 50
End: 2020-04-20
Attending: FAMILY MEDICINE
Payer: COMMERCIAL

## 2020-04-20 DIAGNOSIS — G89.29 CHRONIC FOOT PAIN, RIGHT: Primary | ICD-10-CM

## 2020-04-20 DIAGNOSIS — M54.6 CHRONIC BILATERAL THORACIC BACK PAIN: ICD-10-CM

## 2020-04-20 DIAGNOSIS — G89.4 CHRONIC PAIN SYNDROME: ICD-10-CM

## 2020-04-20 DIAGNOSIS — M79.671 CHRONIC FOOT PAIN, RIGHT: Primary | ICD-10-CM

## 2020-04-20 DIAGNOSIS — G89.29 CHRONIC BILATERAL THORACIC BACK PAIN: ICD-10-CM

## 2020-04-20 PROCEDURE — 99214 OFFICE O/P EST MOD 30 MIN: CPT | Mod: 95 | Performed by: NURSE PRACTITIONER

## 2020-04-20 RX ORDER — GABAPENTIN 100 MG/1
100 CAPSULE ORAL 4 TIMES DAILY
COMMUNITY
Start: 2020-03-20 | End: 2020-09-28

## 2020-04-20 ASSESSMENT — PAIN SCALES - GENERAL: PAINLEVEL: MODERATE PAIN (4)

## 2020-04-20 NOTE — PATIENT INSTRUCTIONS
1.  Pain Physical Therapy:     YES   Highly recommend this gentler form of physical therapy. Order placed today. Call to schedule in about 2 weeks.    2.  Pain Psychologist to address relaxation, behavioral change, coping style, and other factors important to improvement.    YES   Highly recommend visits with pain psychology. Order placed today. Call to schedule in about 2 weeks.    3.  Diagnostic Studies: continue evaluation with neurology and orthopedics as planned.    4.  Medication Management:     1. I understand you are not interested in medications at this point. If at some point you would like to consider options, I would recommend an increase in gabapentin or addition of amitriptyline.     2. We may consider low dose naltrexone as an option, though this chronic pain is a relatively new indication for use of this medication. We can discuss further at next visit.    3. Medical cannabis may be an option in the future as well but would recommend trying other treatments first so that you meet criteria for intractable pain.    5.  Consider the purchase of a TENS unit.    6.  Follow up with MAC Cardoza CNP in 4-6 weeks.

## 2020-04-23 ENCOUNTER — TRANSFERRED RECORDS (OUTPATIENT)
Dept: HEALTH INFORMATION MANAGEMENT | Facility: CLINIC | Age: 50
End: 2020-04-23

## 2020-09-28 ENCOUNTER — OFFICE VISIT (OUTPATIENT)
Dept: OBGYN | Facility: CLINIC | Age: 50
End: 2020-09-28
Payer: COMMERCIAL

## 2020-09-28 VITALS — DIASTOLIC BLOOD PRESSURE: 58 MMHG | SYSTOLIC BLOOD PRESSURE: 102 MMHG | BODY MASS INDEX: 25.74 KG/M2 | WEIGHT: 160.7 LBS

## 2020-09-28 DIAGNOSIS — B00.9 HSV-2 INFECTION: ICD-10-CM

## 2020-09-28 DIAGNOSIS — Z00.00 ROUTINE GENERAL MEDICAL EXAMINATION AT A HEALTH CARE FACILITY: Primary | ICD-10-CM

## 2020-09-28 DIAGNOSIS — E78.00 HYPERCHOLESTEREMIA: ICD-10-CM

## 2020-09-28 DIAGNOSIS — D25.1 INTRAMURAL LEIOMYOMA OF UTERUS: ICD-10-CM

## 2020-09-28 PROCEDURE — 99396 PREV VISIT EST AGE 40-64: CPT | Performed by: OBSTETRICS & GYNECOLOGY

## 2020-09-28 RX ORDER — VALACYCLOVIR HYDROCHLORIDE 500 MG/1
500 TABLET, FILM COATED ORAL DAILY
Qty: 90 TABLET | Refills: 3 | Status: SHIPPED | OUTPATIENT
Start: 2020-09-28 | End: 2021-10-01

## 2020-09-28 NOTE — PROGRESS NOTES
SUBJECTIVE:   CC: Kimberly Rankin is an 50 year old woman who presents for preventive health visit.       Patient has been advised of split billing requirements and indicates understanding: Yes  Healthy Habits:    Do you get at least three servings of calcium containing foods daily (dairy, green leafy vegetables, etc.)? yes    Amount of exercise or daily activities, outside of work: 7 day(s) per week    Problems taking medications regularly No    Medication side effects: No    Have you had an eye exam in the past two years? yes    Do you see a dentist twice per year? yes    Do you have sleep apnea, excessive snoring or daytime drowsiness?no      Had an isolated menses (her LMP) that came at the right time, but lasted about 4 days longer with light flow.  The prior menses all normal.  Has Hx myomas.  Not due for Pap/HPV until 2022.  Also needs cholesterol checked as she had elevated total chol and LDL last year.  May get this done through work but will order here just in case.    Today's PHQ-2 Score:   PHQ-2 ( 1999 Pfizer) 9/27/2019 7/10/2019   Q1: Little interest or pleasure in doing things 0 0   Q2: Feeling down, depressed or hopeless 0 0   PHQ-2 Score 0 0       Abuse: Current or Past(Physical, Sexual or Emotional)- No  Do you feel safe in your environment? Yes        Social History     Tobacco Use     Smoking status: Never Smoker     Smokeless tobacco: Never Used   Substance Use Topics     Alcohol use: Yes     Frequency: Monthly or less     Drinks per session: 1 or 2     Comment: Occasionally     If you drink alcohol do you typically have >3 drinks per day or >7 drinks per week? No                     Reviewed orders with patient.  Reviewed health maintenance and updated orders accordingly - Yes  Current Outpatient Medications   Medication Sig Dispense Refill     cholecalciferol (VITAMIN D-1000 MAX ST) 1000 units TABS Take 1 tablet by mouth every 24 hours       fish oil-omega-3 fatty acids 1000 MG capsule Take  2 g by mouth daily       multivitamin w/minerals (MULTI-VITAMIN) tablet Take 1 tablet by mouth daily       Probiotic Product (PROBIOTIC-10 PO)        valACYclovir (VALTREX) 500 MG tablet Take 1 tablet (500 mg) by mouth daily 90 tablet 3     No Known Allergies    Mammogram Screening: Patient over age 50, mutual decision to screen reflected in health maintenance.    Pertinent mammograms are reviewed under the imaging tab.  History of abnormal Pap smear: NO - age 30- 65 PAP every 3 years recommended  PAP / HPV Latest Ref Rng & Units 2019   PAP - OTHER-NIL, See Result   HPV 16 DNA NEG:Negative Negative   HPV 18 DNA NEG:Negative Negative   OTHER HR HPV NEG:Negative Negative     Reviewed and updated as needed this visit by clinical staff  Tobacco  Allergies  Meds  Med Hx  Surg Hx  Fam Hx  Soc Hx        Reviewed and updated as needed this visit by Provider        Past Medical History:   Diagnosis Date     Abnormal involuntary movement 2009    Overview:  LW Modifier:  left shoulder and thigh LW Onset:  2009 ; Fasciculations Benign     Genital herpes     taking acyclovir regulary as a suppressive medicatino     HSV-2 infection      Otitis media     Patient reports having this a recurrent problem with colds     Seasonal allergies     Dogs and Cats     Temporomandibular joint disorder (TMJ) 2009    Overview:  LW Modifier:  eval at [facility] LW Onset:   ; Temporomandibular Joint Syndrome      Past Surgical History:   Procedure Laterality Date     ARTHROSCOPY KNEE WITH MENISCAL REPAIR Left 2019     COLONOSCOPY  2018    Sessile adenomatous polyp removed - Colonscopy Q 5 years     HYSTEROSCOPY DIAGNOSTIC  2016    Office hysteroscopy at [facility] - Normal cavity     NECK SURGERY  2020    hemilamectomy     OB History    Para Term  AB Living   2 2 2 0 0 2   SAB TAB Ectopic Multiple Live Births   0 0 0 0 2      # Outcome Date GA Lbr Nghia/2nd Weight Sex Delivery Anes PTL Lv    2 Term 12/23/02 40w0d  3.515 kg (7 lb 12 oz) F Vag-Spont EPI N ANASTASIA   1 Term 05/07/00 39w0d  3.147 kg (6 lb 15 oz) M Vag-Spont EPI N ANASTASIA       ROS:  CONSTITUTIONAL: NEGATIVE for fever, chills, change in weight  INTEGUMENTARU/SKIN: NEGATIVE for worrisome rashes, moles or lesions  EYES: NEGATIVE for vision changes or irritation  ENT: NEGATIVE for ear, mouth and throat problems  RESP: NEGATIVE for significant cough or SOB  BREAST: NEGATIVE for masses, tenderness or discharge  CV: NEGATIVE for chest pain, palpitations or peripheral edema  GI: NEGATIVE for nausea, abdominal pain, heartburn, or change in bowel habits  : NEGATIVE for unusual urinary or vaginal symptoms. Periods are regular.  MUSCULOSKELETAL: NEGATIVE for significant arthralgias or myalgia  NEURO: NEGATIVE for weakness, dizziness or paresthesias  PSYCHIATRIC: NEGATIVE for changes in mood or affect    OBJECTIVE:   /58   Wt 72.9 kg (160 lb 11.2 oz)   LMP 09/15/2020   BMI 25.74 kg/m    EXAM:  GENERAL: healthy, alert and no distress  EYES: Eyes grossly normal to inspection, PERRL and conjunctivae and sclerae normal  HENT: ear canals and TM's normal, nose and mouth without ulcers or lesions  NECK: no adenopathy, no asymmetry, masses, or scars and thyroid normal to palpation  RESP: lungs clear to auscultation - no rales, rhonchi or wheezes  BREAST: normal without masses, tenderness or nipple discharge and no palpable axillary masses or adenopathy  CV: regular rate and rhythm, normal S1 S2, no S3 or S4, no murmur, click or rub, no peripheral edema and peripheral pulses strong  ABDOMEN: soft, nontender, no hepatosplenomegaly, no masses and bowel sounds normal   (female): normal female external genitalia, normal urethral meatus, vaginal mucosa pink, moist, well rugated, and normal cervix/adnexa/uterus without masses or discharge  MS: no gross musculoskeletal defects noted, no edema  SKIN: no suspicious lesions or rashes  NEURO: Normal strength and  "tone, mentation intact and speech normal  PSYCH: mentation appears normal, affect normal/bright    Diagnostic Test Results:  none     ASSESSMENT/PLAN:       ICD-10-CM    1. Routine general medical examination at a health care facility  Z00.00    2. Intramural leiomyoma of uterus  D25.1 US Pelvic Complete with Transvaginal   3. HSV-2 infection  B00.9 valACYclovir (VALTREX) 500 MG tablet   4. Hypercholesteremia  E78.00 Lipid panel reflex to direct LDL Fasting     CANCELED: Lipid panel reflex to direct LDL Fasting     Check U/S for fibroid follow-up.  If stable, can follow annually.  If slightly larger, repeat U/S in 6 months.    Rx Valtrex supp for HSV as noted above.    Check lipid panel as noted, unless gets through work.    Patient has been advised of split billing requirements and indicates understanding: Yes  COUNSELING:   Reviewed preventive health counseling, as reflected in patient instructions  Special attention given to:        Healthy diet/nutrition    Estimated body mass index is 25.74 kg/m  as calculated from the following:    Height as of 2/17/20: 1.683 m (5' 6.25\").    Weight as of this encounter: 72.9 kg (160 lb 11.2 oz).        She reports that she has never smoked. She has never used smokeless tobacco.      Counseling Resources:  ATP IV Guidelines  Pooled Cohorts Equation Calculator  Breast Cancer Risk Calculator  BRCA-Related Cancer Risk Assessment: FHS-7 Tool  FRAX Risk Assessment  ICSI Preventive Guidelines  Dietary Guidelines for Americans, 2010  USDA's MyPlate  ASA Prophylaxis  Lung CA Screening    Roshan Oshea MD  [facility]  "

## 2020-09-28 NOTE — NURSING NOTE
"Chief Complaint   Patient presents with     Physical     last pap was 19- NIL with negative HPV.     Patient/info Update     patient has hx of fibroids. Last US was 10/2019- still having abormal bleeding- heavy bleeding, and longer periods.        Initial /58   Wt 72.9 kg (160 lb 11.2 oz)   LMP 09/15/2020   BMI 25.74 kg/m   Estimated body mass index is 25.74 kg/m  as calculated from the following:    Height as of 20: 1.683 m (5' 6.25\").    Weight as of this encounter: 72.9 kg (160 lb 11.2 oz).  BP completed using cuff size: regular    Questioned patient about current smoking habits.  Pt. has never smoked.          The following HM Due: NONE      Ne Carter CMA               "

## 2020-10-14 ENCOUNTER — ANCILLARY PROCEDURE (OUTPATIENT)
Dept: ULTRASOUND IMAGING | Facility: CLINIC | Age: 50
End: 2020-10-14
Attending: OBSTETRICS & GYNECOLOGY
Payer: COMMERCIAL

## 2020-10-14 DIAGNOSIS — D25.1 INTRAMURAL LEIOMYOMA OF UTERUS: ICD-10-CM

## 2020-10-14 PROCEDURE — 76856 US EXAM PELVIC COMPLETE: CPT

## 2020-10-14 PROCEDURE — 76830 TRANSVAGINAL US NON-OB: CPT

## 2020-10-29 ENCOUNTER — PATIENT OUTREACH (OUTPATIENT)
Dept: CARE COORDINATION | Facility: CLINIC | Age: 50
End: 2020-10-29

## 2020-10-29 NOTE — PROGRESS NOTES
Clinical Product Navigator RN reviewed chart; patient on payer product coverage.  Review results: Not met any any referral criteria at this time.  Will monitor for future needs    Grace Coleman RN/Clinical Product Navigator

## 2020-11-16 ENCOUNTER — HEALTH MAINTENANCE LETTER (OUTPATIENT)
Age: 50
End: 2020-11-16

## 2020-12-16 DIAGNOSIS — N83.201 RIGHT OVARIAN CYST: ICD-10-CM

## 2021-01-22 ENCOUNTER — PATIENT OUTREACH (OUTPATIENT)
Dept: CARE COORDINATION | Facility: CLINIC | Age: 51
End: 2021-01-22

## 2021-01-22 NOTE — PROGRESS NOTES
Clinical Product Navigator RN reviewed chart; patient on payer product coverage.  Review results: patient appears to have specialty care established outside of [facility]; however, has historically had primary care and preventive care through [facility].      Will plan to send patient a message via her preferred communication ([employer]) to encourage scheduling of preventive care visit. Will also introduce additional system resources and initiate referrals if patient is interested.     Grace Coleman RN/Clinical Product Navigator

## 2021-05-29 ENCOUNTER — HEALTH MAINTENANCE LETTER (OUTPATIENT)
Age: 51
End: 2021-05-29

## 2021-09-18 ENCOUNTER — HEALTH MAINTENANCE LETTER (OUTPATIENT)
Age: 51
End: 2021-09-18

## 2021-09-27 ENCOUNTER — ANCILLARY PROCEDURE (OUTPATIENT)
Dept: MAMMOGRAPHY | Facility: CLINIC | Age: 51
End: 2021-09-27
Payer: COMMERCIAL

## 2021-09-27 DIAGNOSIS — Z12.31 VISIT FOR SCREENING MAMMOGRAM: ICD-10-CM

## 2021-10-01 ENCOUNTER — OFFICE VISIT (OUTPATIENT)
Dept: OBGYN | Facility: CLINIC | Age: 51
End: 2021-10-01
Payer: COMMERCIAL

## 2021-10-01 VITALS — WEIGHT: 175 LBS | SYSTOLIC BLOOD PRESSURE: 118 MMHG | BODY MASS INDEX: 28.03 KG/M2 | DIASTOLIC BLOOD PRESSURE: 64 MMHG

## 2021-10-01 DIAGNOSIS — B00.9 HSV-2 INFECTION: ICD-10-CM

## 2021-10-01 DIAGNOSIS — Z01.419 ENCOUNTER FOR GYNECOLOGICAL EXAMINATION WITHOUT ABNORMAL FINDING: Primary | ICD-10-CM

## 2021-10-01 PROCEDURE — 99396 PREV VISIT EST AGE 40-64: CPT | Performed by: OBSTETRICS & GYNECOLOGY

## 2021-10-01 RX ORDER — VALACYCLOVIR HYDROCHLORIDE 500 MG/1
500 TABLET, FILM COATED ORAL DAILY
Qty: 90 TABLET | Refills: 3 | Status: SHIPPED | OUTPATIENT
Start: 2021-10-01 | End: 2022-09-12

## 2021-10-01 ASSESSMENT — PATIENT HEALTH QUESTIONNAIRE - PHQ9: SUM OF ALL RESPONSES TO PHQ QUESTIONS 1-9: 0

## 2021-10-01 NOTE — NURSING NOTE
"Chief Complaint   Patient presents with     Gyn Exam     Annual        Initial /64 (BP Location: Right arm, Patient Position: Sitting, Cuff Size: Adult Large)   Wt 79.4 kg (175 lb)   LMP 09/10/2021 (Exact Date)   BMI 28.03 kg/m   Estimated body mass index is 28.03 kg/m  as calculated from the following:    Height as of 20: 1.683 m (5' 6.25\").    Weight as of this encounter: 79.4 kg (175 lb).  BP completed using cuff size: large    Questioned patient about current smoking habits.  Pt. has never smoked.          The following HM Due: NONE    Dev Hennessy CMA              "

## 2021-10-01 NOTE — PROGRESS NOTES
SUBJECTIVE:   CC: Kimberly Rankin is an 51 year old woman who presents for preventive health visit.       Patient has been advised of split billing requirements and indicates understanding: Yes  Not due for Pap and HPV until 2022.  Due for next colonoscopy 2023.  Had normal mammogram 9/27/2021.  Needs Rx Valtrex Rx for HSV 2 suppression.              Today's PHQ-2 Score:   PHQ-2 ( 1999 Pfizer) 9/27/2019   Q1: Little interest or pleasure in doing things 0   Q2: Feeling down, depressed or hopeless 0   PHQ-2 Score 0       Abuse: Current or Past (Physical, Sexual or Emotional) - No  Do you feel safe in your environment? Yes    Have you ever done Advance Care Planning? (For example, a Health Directive, POLST, or a discussion with a medical provider or your loved ones about your wishes): Yes, patient states has an Advance Care Planning document and will bring a copy to the clinic.    Social History     Tobacco Use     Smoking status: Never Smoker     Smokeless tobacco: Never Used   Substance Use Topics     Alcohol use: Yes     Comment: Occasionally     If you drink alcohol do you typically have >3 drinks per day or >7 drinks per week? No    No flowsheet data found.    Reviewed orders with patient.  Reviewed health maintenance and updated orders accordingly - Yes  Current Outpatient Medications   Medication Sig Dispense Refill     fish oil-omega-3 fatty acids 1000 MG capsule Take 2 g by mouth daily       multivitamin w/minerals (MULTI-VITAMIN) tablet Take 1 tablet by mouth daily       Probiotic Product (PROBIOTIC-10 PO)        valACYclovir (VALTREX) 500 MG tablet Take 1 tablet (500 mg) by mouth daily 90 tablet 3     No Known Allergies    Breast Cancer Screening:  Any new diagnosis of family breast, ovarian, or bowel cancer? No    FHS-7:   Breast CA Risk Assessment (FHS-7) 9/27/2021   Did any of your first-degree relatives have breast or ovarian cancer? No   Did any of your relatives have bilateral breast cancer? No    Did any man in your family have breast cancer? No   Did any woman in your family have breast and ovarian cancer? No   Did any woman in your family have breast cancer before age 50 y? No   Do you have 2 or more relatives with breast and/or ovarian cancer? No   Do you have 2 or more relatives with breast and/or bowel cancer? No       Mammogram Screening: Recommended annual mammography  Pertinent mammograms are reviewed under the imaging tab.    History of abnormal Pap smear: NO - age 30- 65 PAP every 3 years recommended  PAP / HPV Latest Ref Rng & Units 2019   PAP (Historical) - OTHER-NIL, See Result   HPV16 NEG:Negative Negative   HPV18 NEG:Negative Negative   HRHPV NEG:Negative Negative     Reviewed and updated as needed this visit by clinical staff  Tobacco  Allergies  Meds   Med Hx  Surg Hx  Fam Hx  Soc Hx        Reviewed and updated as needed this visit by Provider                Past Medical History:   Diagnosis Date     Abnormal involuntary movement 2009    Overview:  LW Modifier:  left shoulder and thigh LW Onset:  2009 ; Fasciculations Benign     Genital herpes     taking acyclovir regulary as a suppressive medicatino     HSV-2 infection      Otitis media     Patient reports having this a recurrent problem with colds     Seasonal allergies     Dogs and Cats     Temporomandibular joint disorder (TMJ) 2009    Overview:  LW Modifier:  eval at [facility] LW Onset:   ; Temporomandibular Joint Syndrome      Past Surgical History:   Procedure Laterality Date     ARTHROSCOPY KNEE WITH MENISCAL REPAIR Left 2019     COLONOSCOPY  2018    Sessile adenomatous polyp removed - Colonscopy Q 5 years     HYSTEROSCOPY DIAGNOSTIC  2016    Office hysteroscopy at [facility] - Normal cavity     NECK SURGERY  2020    hemilamectomy     OB History    Para Term  AB Living   2 2 2 0 0 2   SAB TAB Ectopic Multiple Live Births   0 0 0 0 2      # Outcome Date GA Lbr Nghia/  Weight Sex Delivery Anes PTL Lv   2 Term 12/23/02 40w0d  3.515 kg (7 lb 12 oz) F Vag-Spont EPI N ANASTASIA   1 Term 05/07/00 39w0d  3.147 kg (6 lb 15 oz) M Vag-Spont EPI N ANASTASIA       Review of Systems  CONSTITUTIONAL: NEGATIVE for fever, chills, change in weight  INTEGUMENTARU/SKIN: NEGATIVE for worrisome rashes, moles or lesions  EYES: NEGATIVE for vision changes or irritation  ENT: NEGATIVE for ear, mouth and throat problems  RESP: NEGATIVE for significant cough or SOB  BREAST: NEGATIVE for masses, tenderness or discharge  CV: NEGATIVE for chest pain, palpitations or peripheral edema  GI: NEGATIVE for nausea, abdominal pain, heartburn, or change in bowel habits  : NEGATIVE for unusual urinary or vaginal symptoms. Periods are regular.  MUSCULOSKELETAL: NEGATIVE for significant arthralgias or myalgia  NEURO: NEGATIVE for weakness, dizziness or paresthesias  PSYCHIATRIC: NEGATIVE for changes in mood or affect     OBJECTIVE:   /64 (BP Location: Right arm, Patient Position: Sitting, Cuff Size: Adult Large)   Wt 79.4 kg (175 lb)   LMP 09/10/2021 (Exact Date)   BMI 28.03 kg/m    Physical Exam  GENERAL: healthy, alert and no distress  EYES: Eyes grossly normal to inspection, PERRL and conjunctivae and sclerae normal  HENT: ear canals and TM's normal, nose and mouth without ulcers or lesions  NECK: no adenopathy, no asymmetry, masses, or scars and thyroid normal to palpation  RESP: lungs clear to auscultation - no rales, rhonchi or wheezes  BREAST: normal without masses, tenderness or nipple discharge and no palpable axillary masses or adenopathy  CV: regular rate and rhythm, normal S1 S2, no S3 or S4, no murmur, click or rub, no peripheral edema and peripheral pulses strong  ABDOMEN: soft, nontender, no hepatosplenomegaly, no masses and bowel sounds normal   (female): normal female external genitalia, normal urethral meatus, vaginal mucosa pink, moist, well rugated, and normal cervix/adnexa/uterus without masses or  "discharge  MS: no gross musculoskeletal defects noted, no edema  SKIN: no suspicious lesions or rashes  NEURO: Normal strength and tone, mentation intact and speech normal  PSYCH: mentation appears normal, affect normal/bright    Diagnostic Test Results:  none     ASSESSMENT/PLAN:       ICD-10-CM    1. Encounter for gynecological examination without abnormal finding  Z01.419    2. HSV-2 infection  B00.9 valACYclovir (VALTREX) 500 MG tablet       Patient has been advised of split billing requirements and indicates understanding: Yes  COUNSELING:  Reviewed preventive health counseling, as reflected in patient instructions    Estimated body mass index is 28.03 kg/m  as calculated from the following:    Height as of 2/17/20: 1.683 m (5' 6.25\").    Weight as of this encounter: 79.4 kg (175 lb).        She reports that she has never smoked. She has never used smokeless tobacco.      Counseling Resources:  ATP IV Guidelines  Pooled Cohorts Equation Calculator  Breast Cancer Risk Calculator  BRCA-Related Cancer Risk Assessment: FHS-7 Tool  FRAX Risk Assessment  ICSI Preventive Guidelines  Dietary Guidelines for Americans, 2010  USDA's MyPlate  ASA Prophylaxis  Lung CA Screening    Roshan Oshea MD  [facility]  "

## 2021-10-04 ENCOUNTER — OFFICE VISIT (OUTPATIENT)
Dept: FAMILY MEDICINE | Facility: CLINIC | Age: 51
End: 2021-10-04
Payer: COMMERCIAL

## 2021-10-04 VITALS
WEIGHT: 175 LBS | BODY MASS INDEX: 27.47 KG/M2 | HEIGHT: 67 IN | TEMPERATURE: 98.7 F | SYSTOLIC BLOOD PRESSURE: 112 MMHG | DIASTOLIC BLOOD PRESSURE: 70 MMHG | HEART RATE: 64 BPM | OXYGEN SATURATION: 98 % | RESPIRATION RATE: 12 BRPM

## 2021-10-04 DIAGNOSIS — Z13.220 SCREENING FOR HYPERLIPIDEMIA: ICD-10-CM

## 2021-10-04 DIAGNOSIS — Z86.39 HISTORY OF VITAMIN D DEFICIENCY: ICD-10-CM

## 2021-10-04 DIAGNOSIS — Z86.16 HISTORY OF COVID-19: ICD-10-CM

## 2021-10-04 DIAGNOSIS — Z13.1 SCREENING FOR DIABETES MELLITUS: ICD-10-CM

## 2021-10-04 DIAGNOSIS — Z00.00 ROUTINE GENERAL MEDICAL EXAMINATION AT A HEALTH CARE FACILITY: Primary | ICD-10-CM

## 2021-10-04 PROCEDURE — 99396 PREV VISIT EST AGE 40-64: CPT | Performed by: FAMILY MEDICINE

## 2021-10-04 RX ORDER — CHOLECALCIFEROL (VITAMIN D3) 50 MCG
1 TABLET ORAL DAILY
COMMUNITY
Start: 2021-10-04

## 2021-10-04 ASSESSMENT — ENCOUNTER SYMPTOMS
DIZZINESS: 0
NAUSEA: 0
PARESTHESIAS: 0
ARTHRALGIAS: 0
FREQUENCY: 0
DYSURIA: 0
COUGH: 0
HEADACHES: 0
DIARRHEA: 0
NERVOUS/ANXIOUS: 0
CONSTIPATION: 0
EYE PAIN: 0
HEMATOCHEZIA: 0
WEAKNESS: 0
ABDOMINAL PAIN: 0
JOINT SWELLING: 0
SHORTNESS OF BREATH: 0
MYALGIAS: 0
HEARTBURN: 0
SORE THROAT: 0
HEMATURIA: 0
FEVER: 0
PALPITATIONS: 0
CHILLS: 0

## 2021-10-04 ASSESSMENT — MIFFLIN-ST. JEOR: SCORE: 1433.48

## 2021-10-04 NOTE — PROGRESS NOTES
SUBJECTIVE:   CC: Kimberly Rankin is an 51 year old woman who presents for preventive health visit.     Patient has been advised of split billing requirements and indicates understanding: Yes     Healthy Habits:     Getting at least 3 servings of Calcium per day:  Yes    Bi-annual eye exam:  Yes    Dental care twice a year:  Yes    Sleep apnea or symptoms of sleep apnea:  None    Diet:  Regular (no restrictions)    Frequency of exercise:  6-7 days/week    Duration of exercise:  Greater than 60 minutes    Taking medications regularly:  No    Medication side effects:  None    PHQ-2 Total Score: 0    Additional concerns today:  No      History of COVID-19 in November 2020. Requesting antibody testing. Declines vaccination.      Today's PHQ-2 Score:   PHQ-2 ( 1999 Pfizer) 10/4/2021   Q1: Little interest or pleasure in doing things 0   Q2: Feeling down, depressed or hopeless 0   PHQ-2 Score 0   Q1: Little interest or pleasure in doing things Not at all   Q2: Feeling down, depressed or hopeless Not at all   PHQ-2 Score 0       Abuse: Current or Past (Physical, Sexual or Emotional) - NO  Do you feel safe in your environment? YES        Social History     Tobacco Use     Smoking status: Never Smoker     Smokeless tobacco: Never Used   Substance Use Topics     Alcohol use: Yes     Comment: Occasionally     If you drink alcohol do you typically have >3 drinks per day or >7 drinks per week? No    Alcohol Use 10/4/2021   Prescreen: >3 drinks/day or >7 drinks/week? No   Prescreen: >3 drinks/day or >7 drinks/week? -       Reviewed orders with patient.  Reviewed health maintenance and updated orders accordingly - Yes  Lab work is in process    Breast Cancer Screening:  Any new diagnosis of family breast, ovarian, or bowel cancer? No    FHS-7:   Breast CA Risk Assessment (FHS-7) 9/27/2021   Did any of your first-degree relatives have breast or ovarian cancer? No   Did any of your relatives have bilateral breast cancer? No    Did any man in your family have breast cancer? No   Did any woman in your family have breast and ovarian cancer? No   Did any woman in your family have breast cancer before age 50 y? No   Do you have 2 or more relatives with breast and/or ovarian cancer? No   Do you have 2 or more relatives with breast and/or bowel cancer? No       Mammogram Screening: Recommended annual mammography  Pertinent mammograms are reviewed under the imaging tab.    History of abnormal Pap smear:   PAP / HPV Latest Ref Rng & Units 9/27/2019   PAP (Historical) - OTHER-NIL, See Result   HPV16 NEG:Negative Negative   HPV18 NEG:Negative Negative   HRHPV NEG:Negative Negative     Reviewed and updated as needed this visit by clinical staff  Tobacco  Allergies  Meds   Med Hx  Surg Hx  Fam Hx  Soc Hx        Reviewed and updated as needed this visit by Provider                Past Medical History:   Diagnosis Date     Abnormal involuntary movement 9/29/2009    Overview:  LW Modifier:  left shoulder and thigh LW Onset:  9/1/2009 ; Fasciculations Benign     Genital herpes     taking acyclovir regulary as a suppressive medicatino     HSV-2 infection      Otitis media     Patient reports having this a recurrent problem with colds     Seasonal allergies     Dogs and Cats     Temporomandibular joint disorder (TMJ) 12/16/2009    Overview:  LW Modifier:  eval at [facility] LW Onset:  2009 ; Temporomandibular Joint Syndrome      Past Surgical History:   Procedure Laterality Date     ARTHROSCOPY KNEE WITH MENISCAL REPAIR Left 07/2019     COLONOSCOPY  12/24/2018    Sessile adenomatous polyp removed - Colonscopy Q 5 years     HYSTEROSCOPY DIAGNOSTIC  11/09/2016    Office hysteroscopy at [facility] - Normal cavity     NECK SURGERY  07/2020    hemilamectomy       Review of Systems   Constitutional: Negative for chills and fever.   HENT: Negative for congestion, ear pain, hearing loss and sore throat.    Eyes: Negative for pain and visual disturbance.  "  Respiratory: Negative for cough and shortness of breath.    Cardiovascular: Negative for chest pain, palpitations and peripheral edema.   Gastrointestinal: Negative for abdominal pain, constipation, diarrhea, heartburn, hematochezia and nausea.   Genitourinary: Negative for dysuria, frequency, genital sores, hematuria and urgency.   Musculoskeletal: Negative for arthralgias, joint swelling and myalgias.   Skin: Negative for rash.   Neurological: Negative for dizziness, weakness, headaches and paresthesias.   Psychiatric/Behavioral: Negative for mood changes. The patient is not nervous/anxious.         OBJECTIVE:   /70   Pulse 64   Temp 98.7  F (37.1  C) (Tympanic)   Resp 12   Ht 1.689 m (5' 6.5\")   Wt 79.4 kg (175 lb)   LMP 09/10/2021 (Exact Date)   SpO2 98%   BMI 27.82 kg/m    Physical Exam  GENERAL: healthy, alert and no distress  EYES: Eyes grossly normal to inspection, PERRL and conjunctivae and sclerae normal  HENT: ear canals and TM's normal, nose and mouth without ulcers or lesions  NECK: no adenopathy and no asymmetry, masses, or scars  RESP: lungs clear to auscultation - no rales, rhonchi or wheezes  CV: regular rate and rhythm, normal S1 S2, no S3 or S4, no murmur, click or rub, no peripheral edema and peripheral pulses strong  MS: no gross musculoskeletal defects noted, no edema  SKIN: no suspicious lesions or rashes  PSYCH: mentation appears normal, affect normal/bright    Diagnostic Test Results:  Labs reviewed in Epic    ASSESSMENT/PLAN:       ICD-10-CM    1. Routine general medical examination at a health care facility  Z00.00 REVIEW OF HEALTH MAINTENANCE PROTOCOL ORDERS   2. Screening for diabetes mellitus  Z13.1 Comprehensive metabolic panel (BMP + Alb, Alk Phos, ALT, AST, Total. Bili, TP)   3. Screening for hyperlipidemia  Z13.220 Lipid panel reflex to direct LDL Fasting   4. History of COVID-19  Z86.16 SARS-CoV-2 Nucleocapsid Total Ab   5. History of vitamin D deficiency  Z86.39 " "Vitamin D Deficiency     vitamin D3 (CHOLECALCIFEROL) 50 mcg (2000 units) tablet       Patient has been advised of split billing requirements and indicates understanding: Yes  COUNSELING:  Reviewed preventive health counseling, as reflected in patient instructions       Regular exercise       Healthy diet/nutrition    Estimated body mass index is 27.82 kg/m  as calculated from the following:    Height as of this encounter: 1.689 m (5' 6.5\").    Weight as of this encounter: 79.4 kg (175 lb).    Weight management plan: Discussed healthy diet and exercise guidelines    She reports that she has never smoked. She has never used smokeless tobacco.      Counseling Resources:  ATP IV Guidelines  Pooled Cohorts Equation Calculator  Breast Cancer Risk Calculator  BRCA-Related Cancer Risk Assessment: FHS-7 Tool  FRAX Risk Assessment  ICSI Preventive Guidelines  Dietary Guidelines for Americans, 2010  USDA's MyPlate  ASA Prophylaxis  Lung CA Screening    Aj Olson DO  [facility] [place]  "

## 2021-10-06 ENCOUNTER — LAB (OUTPATIENT)
Dept: LAB | Facility: CLINIC | Age: 51
End: 2021-10-06
Payer: COMMERCIAL

## 2021-10-06 DIAGNOSIS — Z86.39 HISTORY OF VITAMIN D DEFICIENCY: ICD-10-CM

## 2021-10-06 DIAGNOSIS — Z13.1 SCREENING FOR DIABETES MELLITUS: ICD-10-CM

## 2021-10-06 DIAGNOSIS — Z13.220 SCREENING FOR HYPERLIPIDEMIA: ICD-10-CM

## 2021-10-06 DIAGNOSIS — Z86.16 HISTORY OF COVID-19: ICD-10-CM

## 2021-10-06 LAB
ALBUMIN SERPL-MCNC: 3.5 G/DL (ref 3.4–5)
ALP SERPL-CCNC: 34 U/L (ref 40–150)
ALT SERPL W P-5'-P-CCNC: 26 U/L (ref 0–50)
ANION GAP SERPL CALCULATED.3IONS-SCNC: 6 MMOL/L (ref 3–14)
AST SERPL W P-5'-P-CCNC: 17 U/L (ref 0–45)
BILIRUB SERPL-MCNC: 0.4 MG/DL (ref 0.2–1.3)
BUN SERPL-MCNC: 10 MG/DL (ref 7–30)
CALCIUM SERPL-MCNC: 8.7 MG/DL (ref 8.5–10.1)
CHLORIDE BLD-SCNC: 106 MMOL/L (ref 94–109)
CHOLEST SERPL-MCNC: 231 MG/DL
CO2 SERPL-SCNC: 26 MMOL/L (ref 20–32)
CREAT SERPL-MCNC: 0.68 MG/DL (ref 0.52–1.04)
DEPRECATED CALCIDIOL+CALCIFEROL SERPL-MC: 50 UG/L (ref 20–75)
FASTING STATUS PATIENT QL REPORTED: YES
GFR SERPL CREATININE-BSD FRML MDRD: >90 ML/MIN/1.73M2
GLUCOSE BLD-MCNC: 89 MG/DL (ref 70–99)
HDLC SERPL-MCNC: 61 MG/DL
LDLC SERPL CALC-MCNC: 158 MG/DL
NONHDLC SERPL-MCNC: 170 MG/DL
POTASSIUM BLD-SCNC: 4.1 MMOL/L (ref 3.4–5.3)
PROT SERPL-MCNC: 7 G/DL (ref 6.8–8.8)
SODIUM SERPL-SCNC: 138 MMOL/L (ref 133–144)
TRIGL SERPL-MCNC: 60 MG/DL

## 2021-10-06 PROCEDURE — 86769 SARS-COV-2 COVID-19 ANTIBODY: CPT

## 2021-10-06 PROCEDURE — 82306 VITAMIN D 25 HYDROXY: CPT

## 2021-10-06 PROCEDURE — 80061 LIPID PANEL: CPT

## 2021-10-06 PROCEDURE — 80053 COMPREHEN METABOLIC PANEL: CPT

## 2021-10-06 PROCEDURE — 36415 COLL VENOUS BLD VENIPUNCTURE: CPT

## 2021-10-08 LAB — SARS-COV-2 AB SERPL QL IA: POSITIVE

## 2022-01-25 ENCOUNTER — OFFICE VISIT (OUTPATIENT)
Dept: FAMILY MEDICINE | Facility: CLINIC | Age: 52
End: 2022-01-25
Payer: COMMERCIAL

## 2022-01-25 VITALS
BODY MASS INDEX: 25.9 KG/M2 | TEMPERATURE: 98.9 F | HEIGHT: 67 IN | HEART RATE: 64 BPM | WEIGHT: 165 LBS | DIASTOLIC BLOOD PRESSURE: 76 MMHG | OXYGEN SATURATION: 99 % | RESPIRATION RATE: 12 BRPM | SYSTOLIC BLOOD PRESSURE: 118 MMHG

## 2022-01-25 DIAGNOSIS — Z02.9 ADMINISTRATIVE ENCOUNTER: Primary | ICD-10-CM

## 2022-01-25 PROCEDURE — 99212 OFFICE O/P EST SF 10 MIN: CPT | Performed by: NURSE PRACTITIONER

## 2022-01-25 ASSESSMENT — MIFFLIN-ST. JEOR: SCORE: 1388.13

## 2022-01-25 ASSESSMENT — PAIN SCALES - GENERAL: PAINLEVEL: NO PAIN (0)

## 2022-01-25 NOTE — PROGRESS NOTES
"  Assessment & Plan   Problem List Items Addressed This Visit     None      Visit Diagnoses     Administrative encounter    -  Primary          letter for travel provided.    No follow-ups on file.    Milvia Still CNP  [facility]    Ramos Harris is a 51 year old who presents for the following health issues     HPI     Letter request for travel on 1/29/22 - 2/5/22   Positive for Covid 1/12/22  Tested neg on 1/21/22   Pt has not had vaccine     Saturday the 29th traveling.          Review of Systems   CONSTITUTIONAL: NEGATIVE for fever, chills, change in weight  ENT/MOUTH: NEGATIVE for ear, mouth and throat problems  RESP: NEGATIVE for significant cough or SOB  CV: NEGATIVE for chest pain, palpitations or peripheral edema      Objective    /76   Pulse 64   Temp 98.9  F (37.2  C) (Tympanic)   Resp 12   Ht 1.689 m (5' 6.5\")   Wt 74.8 kg (165 lb)   SpO2 99%   BMI 26.23 kg/m    Body mass index is 26.23 kg/m .  Physical Exam   GENERAL: healthy, alert and no distress              LEONARD Brown     [address] [address]  [email or web address]   Office: [phone]                 "

## 2022-01-25 NOTE — LETTER
January 25, 2022      Kimberly Rankin  [address] [address]        To Whom It May Concern,     Kimberly Rankin attended clinic here on Jan 25, 2022.    Patient with recent COVID 19 infection. Tested positive on 1/12/22. Recent negative test on 1/21/22.    Cleared for travel since positive for COVID-19 in the past 90 days.     If you have questions or concerns, please call the clinic at the number listed above.        Sincerely,         Milvia Still, CNP

## 2022-05-06 ENCOUNTER — OFFICE VISIT (OUTPATIENT)
Dept: OBGYN | Facility: CLINIC | Age: 52
End: 2022-05-06
Payer: COMMERCIAL

## 2022-05-06 VITALS
BODY MASS INDEX: 28.93 KG/M2 | SYSTOLIC BLOOD PRESSURE: 112 MMHG | HEIGHT: 66 IN | WEIGHT: 180 LBS | DIASTOLIC BLOOD PRESSURE: 68 MMHG

## 2022-05-06 DIAGNOSIS — D25.1 INTRAMURAL LEIOMYOMA OF UTERUS: Primary | ICD-10-CM

## 2022-05-06 DIAGNOSIS — N92.0 MENORRHAGIA WITH REGULAR CYCLE: ICD-10-CM

## 2022-05-06 PROCEDURE — 99213 OFFICE O/P EST LOW 20 MIN: CPT | Performed by: OBSTETRICS & GYNECOLOGY

## 2022-05-06 NOTE — PROGRESS NOTES
"  Assessment & Plan     Intramural leiomyoma of uterus  - Minimally symptomatic  - US Pelvic Complete with Transvaginal; Future to check for stability.  - If stable or smaller myomas, conservative management is preferred by Pt.  - If larger, she would not be interested in surgical management, but may consider OCPs.    Menorrhagia with regular cycle  - Offered CBC and Ferritin today but she declined  - May consider OCPs as noted above but o/w will manage expectantly given she is 52 yo and hopefully menopause is not that far away.      Review of the result(s) of each unique test - Most recent U/S showing 2 small fibroids.         BMI:   Estimated body mass index is 29.05 kg/m  as calculated from the following:    Height as of this encounter: 1.676 m (5' 6\").    Weight as of this encounter: 81.6 kg (180 lb).           No follow-ups on file.    Roshan Oshea MD  [facility] MARGE Harris is a 51 year old who presents for the following health issues     Pt here to follow-up myomas.  She had prior pelvic U/S showing 2 small myomas, 2.2 and 2.8 cm.  She has menses that are heavy at times with occasional flooding and clots, but tolerates it well.  She also has some dysmenorrhea but does not take NSAIDs for it and Rx's it holistically.  Her menses are Q 28 days with some fluctuation in interval length by a few days, lasting 5 days with heavy flow, no IMBx.  She wanted to discuss options.  Not due for Pap/HPV until this fall.             Review of Systems         Objective    /68 (BP Location: Right arm, Patient Position: Sitting, Cuff Size: Adult Regular)   Ht 1.676 m (5' 6\")   Wt 81.6 kg (180 lb)   LMP 04/26/2022 (Within Days)   BMI 29.05 kg/m    Body mass index is 29.05 kg/m .  Physical Exam  Constitutional:       General: She is not in acute distress.     Appearance: Normal appearance. She is normal weight. She is not ill-appearing.   Neurological:      Mental " Status: She is alert.     Abdomen- Abdomen soft, non-tender. BS normal. No masses, organomegaly  Pelvic- Exam chaperoned by nurse, External genitalia normal, Bartholin's glands normal, [place]'s glands normal, Urethral meatus normal, Urethra normal, Bladder normal, Vagina with normal rugae, no abnormal lesions, no abnormal discharge, Normal cervix without lesions or mucopus, no cervical motion tenderness, Uterus RV, 8-10 weeks size, irreg shape and contour c/w myomas, non-tender, Adnexa normal size without masses or tenderness bilaterally, Anus normal          U/S:  [facility]     ULTRASOUND - PELVIC GYN- Transabdominal and Transvaginal     Referring MD: Roshan Oshea MD     ===================================     CLINICAL INFORMATION     Indications for ultrasound:   Follow-up right ovarian cyst      LMP: 08 Dec 2020    Hormones: none     Measurements:  Uterus:  9.4 x 7.3 x 5.8 cm     Position is retroverted.  Contour is irregular w/ myomata:   1 Left Fundal 1.9 x 2.2 x 2.0 cm and 2 Right Fundal 2.8 x 2.5 x 2.8 cm.     Endo cav: 11.7 mm       smooth  Cervix: wnl     Right ovary: 3.5 x 2.0 x 3.5 cm  Wnl  Left ovary:   1.6 x 1.5 x 2.5 cm Wnl     Cul de sac: no free fluid     ===================================  Impression:  Complete pelvic ultrasound using realtime   transabdominal and transvaginal scanning.  Bladder appears normal.     Normal bilateral ovaries  Uterine fibroids, not affecting the uterine cavity  Normal endometrial lining  No free fluid in the cul de sac     Stable uterine fibroids. Normal endometrial lining for menstruating woman. Resolution of previously noted right ovarian cyst.      Dr. Astrid Manning MD   Obstetrics and Gynecology  [facility]

## 2022-05-06 NOTE — NURSING NOTE
"Chief Complaint   Patient presents with     Consult     Heavy bleeding due to fibroids        Initial /68 (BP Location: Right arm, Patient Position: Sitting, Cuff Size: Adult Regular)   Ht 1.676 m (5' 6\")   Wt 81.6 kg (180 lb)   LMP 2022 (Within Days)   BMI 29.05 kg/m   Estimated body mass index is 29.05 kg/m  as calculated from the following:    Height as of this encounter: 1.676 m (5' 6\").    Weight as of this encounter: 81.6 kg (180 lb).  BP completed using cuff size: regular    Questioned patient about current smoking habits.  Pt. has never smoked.          The following HM Due: NONE    Dev Hennessy CMA                "

## 2022-05-13 ENCOUNTER — ANCILLARY PROCEDURE (OUTPATIENT)
Dept: ULTRASOUND IMAGING | Facility: CLINIC | Age: 52
End: 2022-05-13
Attending: OBSTETRICS & GYNECOLOGY
Payer: COMMERCIAL

## 2022-05-13 DIAGNOSIS — N83.209 OVARIAN CYST: Primary | ICD-10-CM

## 2022-05-13 DIAGNOSIS — D25.1 INTRAMURAL LEIOMYOMA OF UTERUS: ICD-10-CM

## 2022-05-13 PROBLEM — N83.291 COMPLEX CYST OF RIGHT OVARY: Status: ACTIVE | Noted: 2019-10-21

## 2022-05-13 PROCEDURE — 76856 US EXAM PELVIC COMPLETE: CPT | Performed by: OBSTETRICS & GYNECOLOGY

## 2022-05-13 PROCEDURE — 76830 TRANSVAGINAL US NON-OB: CPT | Performed by: OBSTETRICS & GYNECOLOGY

## 2022-09-12 DIAGNOSIS — B00.9 HSV-2 INFECTION: ICD-10-CM

## 2022-09-12 RX ORDER — VALACYCLOVIR HYDROCHLORIDE 500 MG/1
500 TABLET, FILM COATED ORAL DAILY
Qty: 90 TABLET | Refills: 2 | Status: SHIPPED | OUTPATIENT
Start: 2022-09-12 | End: 2023-05-22

## 2022-09-12 NOTE — TELEPHONE ENCOUNTER
valacyclovir      Last Written Prescription Date:  10/1/21  Last Fill Quantity: 90,   # refills: 3  Last Office Visit: 5/6/22  Future Office visit:

## 2022-09-21 ENCOUNTER — OFFICE VISIT (OUTPATIENT)
Dept: FAMILY MEDICINE | Facility: CLINIC | Age: 52
End: 2022-09-21
Payer: COMMERCIAL

## 2022-09-21 VITALS
RESPIRATION RATE: 18 BRPM | BODY MASS INDEX: 24.59 KG/M2 | WEIGHT: 153 LBS | SYSTOLIC BLOOD PRESSURE: 118 MMHG | HEIGHT: 66 IN | HEART RATE: 74 BPM | TEMPERATURE: 98.2 F | OXYGEN SATURATION: 99 % | DIASTOLIC BLOOD PRESSURE: 68 MMHG

## 2022-09-21 DIAGNOSIS — L30.9 DERMATITIS: ICD-10-CM

## 2022-09-21 DIAGNOSIS — B00.9 HERPES SIMPLEX VIRUS (HSV) INFECTION: ICD-10-CM

## 2022-09-21 DIAGNOSIS — Z12.4 CERVICAL CANCER SCREENING: ICD-10-CM

## 2022-09-21 DIAGNOSIS — Z00.00 ROUTINE GENERAL MEDICAL EXAMINATION AT A HEALTH CARE FACILITY: Primary | ICD-10-CM

## 2022-09-21 DIAGNOSIS — Z13.220 LIPID SCREENING: ICD-10-CM

## 2022-09-21 DIAGNOSIS — Z12.31 VISIT FOR SCREENING MAMMOGRAM: ICD-10-CM

## 2022-09-21 PROBLEM — E66.3 OVERWEIGHT (BMI 25.0-29.9): Status: RESOLVED | Noted: 2019-07-12 | Resolved: 2022-09-21

## 2022-09-21 PROCEDURE — 80053 COMPREHEN METABOLIC PANEL: CPT | Performed by: PHYSICIAN ASSISTANT

## 2022-09-21 PROCEDURE — 80061 LIPID PANEL: CPT | Performed by: PHYSICIAN ASSISTANT

## 2022-09-21 PROCEDURE — 36415 COLL VENOUS BLD VENIPUNCTURE: CPT | Performed by: PHYSICIAN ASSISTANT

## 2022-09-21 PROCEDURE — 99396 PREV VISIT EST AGE 40-64: CPT | Performed by: PHYSICIAN ASSISTANT

## 2022-09-21 RX ORDER — TRIAMCINOLONE ACETONIDE 1 MG/G
OINTMENT TOPICAL
COMMUNITY
Start: 2022-03-16 | End: 2024-07-30

## 2022-09-21 ASSESSMENT — ENCOUNTER SYMPTOMS
HEARTBURN: 0
NERVOUS/ANXIOUS: 0
CONSTIPATION: 0
ARTHRALGIAS: 0
MYALGIAS: 0
SORE THROAT: 0
CHILLS: 0
JOINT SWELLING: 0
WEAKNESS: 0
FEVER: 0
DYSURIA: 0
EYE PAIN: 0
PARESTHESIAS: 0
FREQUENCY: 0
SHORTNESS OF BREATH: 0
DIARRHEA: 0
BREAST MASS: 0
HEMATURIA: 0
HEMATOCHEZIA: 0
ABDOMINAL PAIN: 0
PALPITATIONS: 0
NAUSEA: 0
HEADACHES: 0
COUGH: 0
DIZZINESS: 0

## 2022-09-21 NOTE — PROGRESS NOTES
SUBJECTIVE:   CC: Kimberly is an 52 year old who presents for preventive health visit.       Patient has been advised of split billing requirements and indicates understanding: Yes  Healthy Habits:     Getting at least 3 servings of Calcium per day:  Yes    Bi-annual eye exam:  Yes    Dental care twice a year:  Yes    Sleep apnea or symptoms of sleep apnea:  None    Diet:  Other    Frequency of exercise:  6-7 days/week    Duration of exercise:  Greater than 60 minutes    Taking medications regularly:  Yes    Medication side effects:  None    PHQ-2 Total Score: 0    Additional concerns today:  No     Not fasting, but would still like to have labs done.   LDL a little elevated last yr    Sees OB/GYN for female care, declines pap.    The 10-year ASCVD risk score (Padmaja CORONADO JrSamson, et al., 2013) is: 1.4%    Values used to calculate the score:      Age: 52 years      Sex: Female      Is Non- : No      Diabetic: No      Tobacco smoker: No      Systolic Blood Pressure: 118 mmHg      Is BP treated: No      HDL Cholesterol: 61 mg/dL      Total Cholesterol: 231 mg/dL    Takes valtrex 500mg daily chronically - suppressive therapy for genital HSV. Last outbreak was 3 months ago. Admits doesn't like being on meds so has tried to cut back to taking it less than 7 days per week, but reviewed may be at risk for more outbreaks and pt elects to continue with treatment at this time.       Today's PHQ-2 Score:   PHQ-2 ( 1999 Pfizer) 9/21/2022   Q1: Little interest or pleasure in doing things 0   Q2: Feeling down, depressed or hopeless 0   PHQ-2 Score 0   PHQ-2 Total Score (12-17 Years)- Positive if 3 or more points; Administer PHQ-A if positive -   Q1: Little interest or pleasure in doing things Not at all   Q2: Feeling down, depressed or hopeless Not at all   PHQ-2 Score 0       Abuse: Current or Past (Physical, Sexual or Emotional) - No  Do you feel safe in your environment? Yes        Social History     Tobacco  Use     Smoking status: Never Smoker     Smokeless tobacco: Never Used     Tobacco comment: no use ever   Substance Use Topics     Alcohol use: Not Currently     Comment: Occasionally     If you drink alcohol do you typically have >3 drinks per day or >7 drinks per week? No    Alcohol Use 9/21/2022   Prescreen: >3 drinks/day or >7 drinks/week? No   Prescreen: >3 drinks/day or >7 drinks/week? -       Reviewed orders with patient.  Reviewed health maintenance and updated orders accordingly - Yes  Patient Active Problem List   Diagnosis     Herpes simplex virus (HSV) infection - genital     Intramural leiomyoma of uterus     Allergic rhinitis     Tear of meniscus of right knee as current injury, unspecified meniscus, unspecified tear type, initial encounter     Overweight (BMI 25.0-29.9)     History of adverse effect of anesthesia     HSV-2 infection     Screening for cervical cancer     Complex cyst of right ovary     Hypercholesteremia     Menorrhagia with regular cycle     Past Surgical History:   Procedure Laterality Date     ARTHROSCOPY KNEE WITH MENISCAL REPAIR Left 07/2019     BACK SURGERY  7/2020     COLONOSCOPY  12/24/2018    Sessile adenomatous polyp removed - Colonscopy Q 5 years     HYSTEROSCOPY DIAGNOSTIC  11/09/2016    Office hysteroscopy at [facility] - Normal cavity     NECK SURGERY  07/2020    hemilamectomy       Social History     Tobacco Use     Smoking status: Never Smoker     Smokeless tobacco: Never Used     Tobacco comment: no use ever   Substance Use Topics     Alcohol use: Not Currently     Comment: Occasionally     Family History   Problem Relation Age of Onset     Dementia Mother      Heart Disease Father      Dementia Father      Hypertension Father      Hyperlipidemia Father      Kidney Disease Daughter      Breast Cancer No family hx of      Colon Cancer No family hx of          Current Outpatient Medications   Medication Sig Dispense Refill     fish oil-omega-3 fatty acids 1000 MG  capsule Take 2 g by mouth daily       multivitamin w/minerals (THERA-VIT-M) tablet Take 1 tablet by mouth daily       Probiotic Product (PROBIOTIC-10 PO)        triamcinolone (KENALOG) 0.1 % external ointment APPLY TWO TIMES A DAY TO THE AFFECTED AREA ON THE BACK       valACYclovir (VALTREX) 500 MG tablet Take 1 tablet (500 mg) by mouth daily 90 tablet 2     vitamin D3 (CHOLECALCIFEROL) 50 mcg (2000 units) tablet Take 1 tablet (50 mcg) by mouth daily       No Known Allergies    Breast Cancer Screening:    Breast CA Risk Assessment (FHS-7) 9/21/2022   Do you have a family history of breast, colon, or ovarian cancer? No / Unknown         Mammogram Screening: Recommended annual mammography  Pertinent mammograms are reviewed under the imaging tab.    History of abnormal Pap smear: NO - age 30- 65 PAP every 3 years recommended  Typically completed by her OB/GYN for pap smear - declines completion while here today.    PAP / HPV Latest Ref Rng & Units 9/27/2019   PAP (Historical) - OTHER-NIL, See Result   HPV16 NEG:Negative Negative   HPV18 NEG:Negative Negative   HRHPV NEG:Negative Negative     Reviewed and updated as needed this visit by clinical staff   Tobacco  Allergies  Meds   Med Hx  Surg Hx  Fam Hx  Soc Hx          Reviewed and updated as needed this visit by Provider   Tobacco  Allergies  Meds   Med Hx  Surg Hx  Fam Hx  Soc Hx           Review of Systems   Constitutional: Negative for chills and fever.   HENT: Negative for congestion, ear pain, hearing loss and sore throat.    Eyes: Negative for pain and visual disturbance.   Respiratory: Negative for cough and shortness of breath.    Cardiovascular: Negative for chest pain, palpitations and peripheral edema.   Gastrointestinal: Negative for abdominal pain, constipation, diarrhea, heartburn, hematochezia and nausea.   Breasts:  Negative for tenderness, breast mass and discharge.   Genitourinary: Negative for dysuria, frequency, genital sores,  "hematuria, pelvic pain, urgency, vaginal bleeding and vaginal discharge.   Musculoskeletal: Negative for arthralgias, joint swelling and myalgias.   Skin: Positive for rash.        Recurrent rash of back x4-5 yrs. Saw derm 3/2022 and given topical kenalog cream and told to follow-up if not resolved. Feels this helps \"tone it down\", but then resurfaces if stops using it. Itchy and feels like there's something in there. Tried essential oil. No apparent triggers, but wonders about sweating jogging bras as occurs right under where this occurs.    Neurological: Negative for dizziness, weakness, headaches and paresthesias.   Psychiatric/Behavioral: Negative for mood changes. The patient is not nervous/anxious.         OBJECTIVE:   /68   Pulse 74   Temp 98.2  F (36.8  C)   Resp 18   Ht 1.676 m (5' 6\")   Wt 69.4 kg (153 lb)   SpO2 99%   BMI 24.69 kg/m    Physical Exam  GENERAL: healthy, alert and no distress  EYES: Eyes grossly normal to inspection, PERRL and conjunctivae and sclerae normal  HENT: ear canals and TM's normal, nose and mouth without ulcers or lesions  NECK: no adenopathy, no asymmetry, masses, or scars and thyroid normal to palpation  RESP: lungs clear to auscultation - no rales, rhonchi or wheezes  BREAST: deferred by pt  CV: regular rate and rhythm, normal S1 S2, no S3 or S4, no murmur, click or rub, no peripheral edema and peripheral pulses strong  ABDOMEN: soft, nontender, no hepatosplenomegaly, no masses and bowel sounds normal  MS: no gross musculoskeletal defects noted, no edema  SKIN: slight erythema, xerosis, thickening along same triangular distribution of upper back where sports bra would sit. Mild without secondary cellulitis or any vesicles.   NEURO: Normal strength and tone, mentation intact and speech normal  PSYCH: mentation appears normal, affect normal/bright    Diagnostic Test Results:  Labs reviewed in Epic    ASSESSMENT/PLAN:   Kimberly was seen today for " "physical.    Diagnoses and all orders for this visit:    Routine general medical examination at a health care facility  -     Reviewed preventative health recommendations for age.  - REVIEW OF HEALTH MAINTENANCE PROTOCOL ORDERS    Herpes simplex virus (HSV) infection - genital  -     Stable on suppressive therapy with valtrex and discussed stopping, but pt tried to limit on own and had outbreak so reviewed likely to be at ongoing risk for outbreak with cessation of antiviral therapy. Pt elected to continue at this time. BMP/CMP annually for med monitoring.  - Comprehensive metabolic panel (BMP + Alb, Alk Phos, ALT, AST, Total. Bili, TP); Future  -     Comprehensive metabolic panel (BMP + Alb, Alk Phos, ALT, AST, Total. Bili, TP)    Dermatitis   - Hx suggests contact dermatitis given fits triangular distribution of her sports bra. Advised avoidance until resolved and can use kenalog from derm as planned in meantime. If does not resolve entirely then would recommend recheck with derm as planned.    Visit for screening mammogram  -     MA Screen Bilateral w/Alex; Future    Cervical cancer screening   - Due today, but pt declines as wishes to have done with her OB/GYN.    Lipid screening  -     Lipid panel reflex to direct LDL Fasting; Future  -     Lipid panel reflex to direct LDL Fasting      COUNSELING:  Reviewed preventive health counseling, as reflected in patient instructions       Regular exercise       Healthy diet/nutrition       Immunizations    Declined: Influenza and covid vaccines               Colorectal Cancer Screening    Estimated body mass index is 24.69 kg/m  as calculated from the following:    Height as of this encounter: 1.676 m (5' 6\").    Weight as of this encounter: 69.4 kg (153 lb).        She reports that she has never smoked. She has never used smokeless tobacco.      Counseling Resources:  ATP IV Guidelines  Pooled Cohorts Equation Calculator  Breast Cancer Risk Calculator  BRCA-Related " Cancer Risk Assessment: FHS-7 Tool  FRAX Risk Assessment  ICSI Preventive Guidelines  Dietary Guidelines for Americans, 2010  USDA's MyPlate  ASA Prophylaxis  Lung CA Screening    Zayda Torres PA-C  [facility]

## 2022-09-22 LAB
ALBUMIN SERPL-MCNC: 4 G/DL (ref 3.4–5)
ALP SERPL-CCNC: 34 U/L (ref 40–150)
ALT SERPL W P-5'-P-CCNC: 34 U/L (ref 0–50)
ANION GAP SERPL CALCULATED.3IONS-SCNC: 10 MMOL/L (ref 3–14)
AST SERPL W P-5'-P-CCNC: 22 U/L (ref 0–45)
BILIRUB SERPL-MCNC: 0.3 MG/DL (ref 0.2–1.3)
BUN SERPL-MCNC: 14 MG/DL (ref 7–30)
CALCIUM SERPL-MCNC: 9 MG/DL (ref 8.5–10.1)
CHLORIDE BLD-SCNC: 106 MMOL/L (ref 94–109)
CHOLEST SERPL-MCNC: 273 MG/DL
CO2 SERPL-SCNC: 23 MMOL/L (ref 20–32)
CREAT SERPL-MCNC: 0.58 MG/DL (ref 0.52–1.04)
FASTING STATUS PATIENT QL REPORTED: YES
GFR SERPL CREATININE-BSD FRML MDRD: >90 ML/MIN/1.73M2
GLUCOSE BLD-MCNC: 82 MG/DL (ref 70–99)
HDLC SERPL-MCNC: 75 MG/DL
LDLC SERPL CALC-MCNC: 185 MG/DL
NONHDLC SERPL-MCNC: 198 MG/DL
POTASSIUM BLD-SCNC: 4.1 MMOL/L (ref 3.4–5.3)
PROT SERPL-MCNC: 7.3 G/DL (ref 6.8–8.8)
SODIUM SERPL-SCNC: 139 MMOL/L (ref 133–144)
TRIGL SERPL-MCNC: 63 MG/DL

## 2022-10-01 NOTE — RESULT ENCOUNTER NOTE
Dear Kimberly,      Your recent test results are noted below:    -LDL(bad) cholesterol level is elevated which can increase your heart disease risk.  A diet high in fat and simple carbohydrates, genetics and being overweight can contribute to this. ADVISE: exercising 150 minutes of aerobic exercise per week (30 minutes for 5 days per week or 50 minutes for 3 days per week are options) and eating a low saturated fat/low carbohydrate diet are helpful to improve this. In 3-6 months, you should recheck your fasting cholesterol panel by scheduling a lab-only appointment.    -Liver and gallbladder tests are normal (ALT,AST, Alk phos - slightly low, but stable, bilirubin), kidney function is normal (Cr, GFR), sodium is normal, potassium is normal, calcium is normal, glucose is normal.    For additional lab test information, [email or web address] is an excellent reference. Please contact the clinic at [phone] with any further questions or concerns.    Sincerely,      Zayda Torres PA-C  [facility]

## 2022-10-28 ENCOUNTER — HOSPITAL ENCOUNTER (OUTPATIENT)
Dept: MAMMOGRAPHY | Facility: CLINIC | Age: 52
Discharge: HOME | End: 2022-10-28
Attending: PHYSICIAN ASSISTANT | Admitting: PHYSICIAN ASSISTANT
Payer: COMMERCIAL

## 2022-10-28 DIAGNOSIS — Z12.31 VISIT FOR SCREENING MAMMOGRAM: ICD-10-CM

## 2022-10-28 PROCEDURE — 77067 SCR MAMMO BI INCL CAD: CPT

## 2022-11-04 NOTE — RESULT ENCOUNTER NOTE
Dear Kimberly,      Your recent test results are noted below:    -Mammogram was normal.  ADVISE: rechecking in 1 year.    For additional lab test information, [email or web address] is an excellent reference. Please contact the clinic at [phone] with any further questions or concerns.    Sincerely,      Zayda Torres PA-C  [facility]

## 2023-01-27 DIAGNOSIS — D25.9 UTERINE MYOMA: Primary | ICD-10-CM

## 2023-02-03 ENCOUNTER — ANCILLARY PROCEDURE (OUTPATIENT)
Dept: ULTRASOUND IMAGING | Facility: CLINIC | Age: 53
End: 2023-02-03
Attending: OBSTETRICS & GYNECOLOGY
Payer: COMMERCIAL

## 2023-02-03 DIAGNOSIS — D25.9 UTERINE MYOMA: ICD-10-CM

## 2023-02-03 PROCEDURE — 76830 TRANSVAGINAL US NON-OB: CPT | Performed by: OBSTETRICS & GYNECOLOGY

## 2023-02-03 PROCEDURE — 76856 US EXAM PELVIC COMPLETE: CPT | Performed by: OBSTETRICS & GYNECOLOGY

## 2023-02-06 DIAGNOSIS — D25.9 FIBROID UTERUS: Primary | ICD-10-CM

## 2023-02-20 ENCOUNTER — NURSE TRIAGE (OUTPATIENT)
Dept: NURSING | Facility: CLINIC | Age: 53
End: 2023-02-20
Payer: COMMERCIAL

## 2023-02-20 NOTE — TELEPHONE ENCOUNTER
"Pt asks for her imaging records to be \"pushed to Care Everywhere.\"  Therefore warm-transferred pt to Medical Records and provided phone contact info for this department.  No further questions at this time.    Shivani ELLIS [facility] Nurse Advisor     Additional Information    General information question, no triage required and triager able to answer question    Protocols used: INFORMATION ONLY CALL-A-AH      "

## 2023-05-22 DIAGNOSIS — B00.9 HSV-2 INFECTION: ICD-10-CM

## 2023-05-22 RX ORDER — VALACYCLOVIR HYDROCHLORIDE 500 MG/1
500 TABLET, FILM COATED ORAL DAILY
Qty: 90 TABLET | Refills: 0 | Status: SHIPPED | OUTPATIENT
Start: 2023-05-22 | End: 2023-09-15

## 2023-05-22 NOTE — TELEPHONE ENCOUNTER
Medication is being filled for 1 time refill only due to:  Patient needs to be seen because it has been more than one year since last visit.     Hui TOMLINSON RN

## 2023-05-22 NOTE — TELEPHONE ENCOUNTER
valacyclovir      Last Written Prescription Date:  9/12/22  Last Fill Quantity: 90,   # refills: 2  Last Office Visit: 5/6/22  Future Office visit:

## 2023-08-10 DIAGNOSIS — B00.9 HSV-2 INFECTION: ICD-10-CM

## 2023-08-10 NOTE — TELEPHONE ENCOUNTER
[employer] msg sent to pt to let her know she is overdue for an appt. Will check back.    SUSAN Victor

## 2023-08-10 NOTE — TELEPHONE ENCOUNTER
Valacyclovir tab 500MG      Last Written Prescription Date:  5/22/2023  Last Fill Quantity: 90,   # refills: 0  Last Office Visit: 5/6/2022  Future Office visit:       Kim Chávez CMA

## 2023-08-11 ENCOUNTER — ANCILLARY PROCEDURE (OUTPATIENT)
Dept: ULTRASOUND IMAGING | Facility: CLINIC | Age: 53
End: 2023-08-11
Attending: OBSTETRICS & GYNECOLOGY
Payer: COMMERCIAL

## 2023-08-11 DIAGNOSIS — D25.9 FIBROID UTERUS: ICD-10-CM

## 2023-08-11 PROCEDURE — 76856 US EXAM PELVIC COMPLETE: CPT | Performed by: FAMILY MEDICINE

## 2023-08-11 PROCEDURE — 76830 TRANSVAGINAL US NON-OB: CPT | Performed by: FAMILY MEDICINE

## 2023-08-14 RX ORDER — VALACYCLOVIR HYDROCHLORIDE 500 MG/1
500 TABLET, FILM COATED ORAL DAILY
Qty: 90 TABLET | Refills: 0 | OUTPATIENT
Start: 2023-08-14

## 2023-08-26 ENCOUNTER — TRANSFERRED RECORDS (OUTPATIENT)
Dept: HEALTH INFORMATION MANAGEMENT | Facility: CLINIC | Age: 53
End: 2023-08-26
Payer: COMMERCIAL

## 2023-09-06 ENCOUNTER — HOSPITAL ENCOUNTER (OUTPATIENT)
Dept: MAMMOGRAPHY | Facility: CLINIC | Age: 53
Discharge: HOME | End: 2023-09-06
Attending: NURSE PRACTITIONER | Admitting: NURSE PRACTITIONER
Payer: COMMERCIAL

## 2023-09-06 DIAGNOSIS — Z12.31 VISIT FOR SCREENING MAMMOGRAM: ICD-10-CM

## 2023-09-06 PROCEDURE — 77067 SCR MAMMO BI INCL CAD: CPT

## 2023-09-15 ENCOUNTER — OFFICE VISIT (OUTPATIENT)
Dept: FAMILY MEDICINE | Facility: CLINIC | Age: 53
End: 2023-09-15
Payer: COMMERCIAL

## 2023-09-15 ENCOUNTER — PATIENT OUTREACH (OUTPATIENT)
Dept: CARE COORDINATION | Facility: CLINIC | Age: 53
End: 2023-09-15

## 2023-09-15 VITALS
RESPIRATION RATE: 18 BRPM | WEIGHT: 164.1 LBS | HEART RATE: 72 BPM | DIASTOLIC BLOOD PRESSURE: 60 MMHG | SYSTOLIC BLOOD PRESSURE: 118 MMHG | TEMPERATURE: 96.1 F | OXYGEN SATURATION: 98 % | HEIGHT: 66 IN | BODY MASS INDEX: 26.37 KG/M2

## 2023-09-15 DIAGNOSIS — Z13.29 SCREENING FOR THYROID DISORDER: ICD-10-CM

## 2023-09-15 DIAGNOSIS — R21 RASH: ICD-10-CM

## 2023-09-15 DIAGNOSIS — Z00.00 ROUTINE GENERAL MEDICAL EXAMINATION AT A HEALTH CARE FACILITY: Primary | ICD-10-CM

## 2023-09-15 DIAGNOSIS — Z13.0 SCREENING FOR DEFICIENCY ANEMIA: ICD-10-CM

## 2023-09-15 DIAGNOSIS — Z12.4 CERVICAL CANCER SCREENING: ICD-10-CM

## 2023-09-15 DIAGNOSIS — B00.9 HSV-2 INFECTION: ICD-10-CM

## 2023-09-15 DIAGNOSIS — Z13.220 SCREENING FOR HYPERLIPIDEMIA: ICD-10-CM

## 2023-09-15 PROCEDURE — 87624 HPV HI-RISK TYP POOLED RSLT: CPT | Performed by: NURSE PRACTITIONER

## 2023-09-15 PROCEDURE — 99214 OFFICE O/P EST MOD 30 MIN: CPT | Mod: 25 | Performed by: NURSE PRACTITIONER

## 2023-09-15 PROCEDURE — 99396 PREV VISIT EST AGE 40-64: CPT | Performed by: NURSE PRACTITIONER

## 2023-09-15 PROCEDURE — G0145 SCR C/V CYTO,THINLAYER,RESCR: HCPCS | Performed by: NURSE PRACTITIONER

## 2023-09-15 RX ORDER — KETOCONAZOLE 20 MG/G
CREAM TOPICAL
Qty: 30 G | Refills: 1 | Status: SHIPPED | OUTPATIENT
Start: 2023-09-15

## 2023-09-15 RX ORDER — VALACYCLOVIR HYDROCHLORIDE 500 MG/1
500 TABLET, FILM COATED ORAL DAILY
Qty: 90 TABLET | Refills: 3 | Status: SHIPPED | OUTPATIENT
Start: 2023-09-15 | End: 2024-07-30

## 2023-09-15 ASSESSMENT — ENCOUNTER SYMPTOMS
NAUSEA: 0
SHORTNESS OF BREATH: 0
HEMATURIA: 0
NERVOUS/ANXIOUS: 0
JOINT SWELLING: 0
PARESTHESIAS: 1
FEVER: 0
FREQUENCY: 0
PALPITATIONS: 0
CHILLS: 0
HEARTBURN: 0
MYALGIAS: 0
HEADACHES: 0
CONSTIPATION: 0
DIARRHEA: 0
DYSURIA: 0
ABDOMINAL PAIN: 0
DIZZINESS: 0
EYE PAIN: 0
HEMATOCHEZIA: 0
BREAST MASS: 0
ARTHRALGIAS: 1
WEAKNESS: 0
COUGH: 0
SORE THROAT: 0

## 2023-09-15 NOTE — PROGRESS NOTES
SUBJECTIVE:   CC: Kimberly is an 53 year old who presents for preventive health visit.       9/15/2023     7:38 AM   Additional Questions   Roomed by Spring BENJI   Accompanied by Self       Healthy Habits:     Getting at least 3 servings of Calcium per day:  Yes    Bi-annual eye exam:  Yes    Dental care twice a year:  Yes    Sleep apnea or symptoms of sleep apnea:  None    Diet:  Carbohydrate counting, Gluten-free/reduced, Breakfast skipped and Other    Frequency of exercise:  6-7 days/week    Duration of exercise:  Greater than 60 minutes    Taking medications regularly:  Yes    Medication side effects:  None    Additional concerns today:  Yes        HSV suppression needs refill.  Rash ? fungal        Social History     Tobacco Use    Smoking status: Never    Smokeless tobacco: Never    Tobacco comments:     no use ever   Substance Use Topics    Alcohol use: Not Currently     Comment: Occasionally             9/15/2023     7:42 AM   Alcohol Use   Prescreen: >3 drinks/day or >7 drinks/week? No          No data to display              Reviewed orders with patient.  Reviewed health maintenance and updated orders accordingly - Yes  Lab work is in process  Labs reviewed in EPIC  BP Readings from Last 3 Encounters:   09/15/23 118/60   09/21/22 118/68   05/06/22 112/68    Wt Readings from Last 3 Encounters:   09/15/23 74.4 kg (164 lb 1.6 oz)   09/21/22 69.4 kg (153 lb)   05/06/22 81.6 kg (180 lb)                  Patient Active Problem List   Diagnosis    Herpes simplex virus (HSV) infection - genital    Intramural leiomyoma of uterus    Allergic rhinitis    Tear of meniscus of right knee as current injury, unspecified meniscus, unspecified tear type, initial encounter    History of adverse effect of anesthesia    HSV-2 infection    Screening for cervical cancer    Complex cyst of right ovary    Hypercholesteremia    Menorrhagia with regular cycle     Past Surgical History:   Procedure Laterality Date    ARTHROSCOPY KNEE  WITH MENISCAL REPAIR Left 2019    BACK SURGERY  2020    COLONOSCOPY  2018    Sessile adenomatous polyp removed - Colonscopy Q 5 years    HYSTEROSCOPY DIAGNOSTIC  2016    Office hysteroscopy at [facility] - Normal cavity    NECK SURGERY  2020    hemilamectomy       Social History     Tobacco Use    Smoking status: Never    Smokeless tobacco: Never    Tobacco comments:     no use ever   Substance Use Topics    Alcohol use: Not Currently     Comment: Occasionally     Family History   Problem Relation Age of Onset    Dementia Mother     Heart Disease Father     Dementia Father     Hypertension Father     Hyperlipidemia Father     Kidney Disease Daughter     Breast Cancer No family hx of     Colon Cancer No family hx of     Ovarian Cancer No family hx of          Current Outpatient Medications   Medication Sig Dispense Refill    fish oil-omega-3 fatty acids 1000 MG capsule Take 2 g by mouth daily      ketoconazole (NIZORAL) 2 % external cream Apply to affected and surrounding area(s) once daily until clinical resolution, typically 1 to 3 weeks 30 g 1    multivitamin w/minerals (THERA-VIT-M) tablet Take 1 tablet by mouth daily      Probiotic Product (PROBIOTIC-10 PO)       triamcinolone (KENALOG) 0.1 % external ointment APPLY TWO TIMES A DAY TO THE AFFECTED AREA ON THE BACK      valACYclovir (VALTREX) 500 MG tablet Take 1 tablet (500 mg) by mouth daily 90 tablet 3    vitamin D3 (CHOLECALCIFEROL) 50 mcg (2000 units) tablet Take 1 tablet (50 mcg) by mouth daily       No Known Allergies    Breast Cancer Screenin/21/2022    11:16 AM   Breast CA Risk Assessment (FHS-7)   Do you have a family history of breast, colon, or ovarian cancer? No / Unknown         Mammogram Screening: Recommended annual mammography  Pertinent mammograms are reviewed under the imaging tab.    History of abnormal Pap smear: NO - age 30- 65 PAP every 3 years recommended      Latest Ref Rng & Units 9/15/2023     8:40 AM  "9/27/2019    11:39 AM 9/27/2019    11:37 AM   PAP / HPV   PAP  Negative for Intraepithelial Lesion or Malignancy (NILM)      PAP (Historical)   OTHER-NIL, See Result     HPV 16 DNA Negative Negative   Negative    HPV 18 DNA Negative Negative   Negative    Other HR HPV Negative Negative   Negative      Reviewed and updated as needed this visit by clinical staff   Tobacco  Allergies  Meds  Problems  Med Hx  Surg Hx  Fam Hx          Reviewed and updated as needed this visit by Provider   Tobacco  Allergies  Meds  Problems  Med Hx  Surg Hx  Fam Hx           Review of Systems   Constitutional:  Negative for chills and fever.   HENT:  Negative for congestion, ear pain, hearing loss and sore throat.    Eyes:  Negative for pain and visual disturbance.   Respiratory:  Negative for cough and shortness of breath.    Cardiovascular:  Negative for chest pain, palpitations and peripheral edema.   Gastrointestinal:  Negative for abdominal pain, constipation, diarrhea, heartburn, hematochezia and nausea.   Breasts:  Negative for tenderness, breast mass and discharge.   Genitourinary:  Positive for vaginal bleeding. Negative for dysuria, frequency, genital sores, hematuria, pelvic pain, urgency and vaginal discharge.   Musculoskeletal:  Positive for arthralgias. Negative for joint swelling and myalgias.   Skin:  Positive for rash.   Neurological:  Positive for paresthesias. Negative for dizziness, weakness and headaches.   Psychiatric/Behavioral:  Negative for mood changes. The patient is not nervous/anxious.        OBJECTIVE:   /60   Pulse 72   Temp (!) 96.1  F (35.6  C) (Tympanic)   Resp 18   Ht 1.676 m (5' 6\")   Wt 74.4 kg (164 lb 1.6 oz)   SpO2 98%   BMI 26.49 kg/m    Physical Exam  GENERAL: healthy, alert and no distress  EYES: Eyes grossly normal to inspection, PERRL and conjunctivae and sclerae normal  HENT: ear canals and TM's normal, nose and mouth without ulcers or lesions  NECK: no adenopathy, " no asymmetry, masses, or scars and thyroid normal to palpation  RESP: lungs clear to auscultation - no rales, rhonchi or wheezes  CV: regular rate and rhythm, normal S1 S2, no S3 or S4, no murmur, click or rub, no peripheral edema and peripheral pulses strong  ABDOMEN: soft, nontender, no hepatosplenomegaly, no masses and bowel sounds normal   (female): normal female external genitalia, normal urethral meatus, vaginal mucosa pink, moist, well rugated, and normal cervix/adnexa/uterus without masses or discharge  MS: no gross musculoskeletal defects noted, no edema  SKIN: no suspicious lesions or rashes  NEURO: Normal strength and tone, mentation intact and speech normal  PSYCH: mentation appears normal, affect normal/bright    Diagnostic Test Results:  Labs reviewed in Epic    ASSESSMENT/PLAN:   Kimberly was seen today for physical.    Diagnoses and all orders for this visit:    Routine general medical examination at a health care facility  Well woman exam with Pap smear completed today.    Fasting labs today.    Will notify of lab results.   -     COMPREHENSIVE METABOLIC PANEL; Future  -     REVIEW OF HEALTH MAINTENANCE PROTOCOL ORDERS    Cervical cancer screening  -     Pap Screen with HPV - recommended age 30 - 65 years  -     HPV Hold (Lab Only)  -     HPV High Risk Types DNA Cervical    Rash  -     ketoconazole (NIZORAL) 2 % external cream; Apply to affected and surrounding area(s) once daily until clinical resolution, typically 1 to 3 weeks    HSV-2 infection  No concerns.  Stable.  Continue same medication this was refilled today.   -     valACYclovir (VALTREX) 500 MG tablet; Take 1 tablet (500 mg) by mouth daily    Screening for deficiency anemia  -     CBC with platelets; Future    Screening for thyroid disorder  -     TSH with free T4 reflex; Future    Screening for hyperlipidemia  -     Lipid panel reflex to direct LDL Fasting; Future        Patient has been advised of split billing requirements and  indicates understanding: Yes      COUNSELING:  Reviewed preventive health counseling, as reflected in patient instructions        She reports that she has never smoked. She has never used smokeless tobacco.               MAC Wright CNP  [facility] [place]

## 2023-09-19 LAB
BKR LAB AP GYN ADEQUACY: NORMAL
BKR LAB AP GYN INTERPRETATION: NORMAL
BKR LAB AP HPV REFLEX: NORMAL
BKR LAB AP PREVIOUS ABNORMAL: NORMAL
PATH REPORT.COMMENTS IMP SPEC: NORMAL
PATH REPORT.COMMENTS IMP SPEC: NORMAL
PATH REPORT.RELEVANT HX SPEC: NORMAL

## 2023-09-21 PROBLEM — Z12.4 SCREENING FOR CERVICAL CANCER: Status: ACTIVE | Noted: 2019-10-09

## 2023-09-21 LAB
HUMAN PAPILLOMA VIRUS 16 DNA: NEGATIVE
HUMAN PAPILLOMA VIRUS 18 DNA: NEGATIVE
HUMAN PAPILLOMA VIRUS FINAL DIAGNOSIS: NORMAL
HUMAN PAPILLOMA VIRUS OTHER HR: NEGATIVE

## 2023-09-29 ENCOUNTER — PATIENT OUTREACH (OUTPATIENT)
Dept: CARE COORDINATION | Facility: CLINIC | Age: 53
End: 2023-09-29
Payer: COMMERCIAL

## 2024-07-29 SDOH — HEALTH STABILITY: PHYSICAL HEALTH: ON AVERAGE, HOW MANY DAYS PER WEEK DO YOU ENGAGE IN MODERATE TO STRENUOUS EXERCISE (LIKE A BRISK WALK)?: 6 DAYS

## 2024-07-29 SDOH — HEALTH STABILITY: PHYSICAL HEALTH: ON AVERAGE, HOW MANY MINUTES DO YOU ENGAGE IN EXERCISE AT THIS LEVEL?: 60 MIN

## 2024-07-29 ASSESSMENT — SOCIAL DETERMINANTS OF HEALTH (SDOH): HOW OFTEN DO YOU GET TOGETHER WITH FRIENDS OR RELATIVES?: TWICE A WEEK

## 2024-07-30 ENCOUNTER — OFFICE VISIT (OUTPATIENT)
Dept: FAMILY MEDICINE | Facility: CLINIC | Age: 54
End: 2024-07-30
Payer: COMMERCIAL

## 2024-07-30 ENCOUNTER — PATIENT OUTREACH (OUTPATIENT)
Dept: CARE COORDINATION | Facility: CLINIC | Age: 54
End: 2024-07-30

## 2024-07-30 VITALS
HEART RATE: 78 BPM | HEIGHT: 67 IN | WEIGHT: 172.3 LBS | DIASTOLIC BLOOD PRESSURE: 60 MMHG | SYSTOLIC BLOOD PRESSURE: 110 MMHG | TEMPERATURE: 97.5 F | OXYGEN SATURATION: 97 % | RESPIRATION RATE: 16 BRPM | BODY MASS INDEX: 27.04 KG/M2

## 2024-07-30 DIAGNOSIS — Z12.11 SCREEN FOR COLON CANCER: ICD-10-CM

## 2024-07-30 DIAGNOSIS — Z00.00 ROUTINE GENERAL MEDICAL EXAMINATION AT A HEALTH CARE FACILITY: Primary | ICD-10-CM

## 2024-07-30 DIAGNOSIS — Z11.3 SCREENING FOR STDS (SEXUALLY TRANSMITTED DISEASES): ICD-10-CM

## 2024-07-30 DIAGNOSIS — E53.8 VITAMIN B12 DEFICIENCY (NON ANEMIC): ICD-10-CM

## 2024-07-30 DIAGNOSIS — G95.0 SYRINX OF SPINAL CORD (H): ICD-10-CM

## 2024-07-30 DIAGNOSIS — Z79.899 MEDICATION MANAGEMENT: ICD-10-CM

## 2024-07-30 DIAGNOSIS — K13.0 DRY LIPS: ICD-10-CM

## 2024-07-30 DIAGNOSIS — Z13.1 SCREENING FOR DIABETES MELLITUS: ICD-10-CM

## 2024-07-30 DIAGNOSIS — Z13.0 SCREENING FOR DEFICIENCY ANEMIA: ICD-10-CM

## 2024-07-30 DIAGNOSIS — Z13.29 SCREENING FOR THYROID DISORDER: ICD-10-CM

## 2024-07-30 DIAGNOSIS — Z63.5 MARITAL CONFLICT INVOLVING DIVORCE: ICD-10-CM

## 2024-07-30 DIAGNOSIS — Z13.220 SCREENING FOR HYPERLIPIDEMIA: ICD-10-CM

## 2024-07-30 DIAGNOSIS — B00.9 HSV-2 INFECTION: ICD-10-CM

## 2024-07-30 DIAGNOSIS — E55.9 VITAMIN D DEFICIENCY: ICD-10-CM

## 2024-07-30 DIAGNOSIS — Z12.31 VISIT FOR SCREENING MAMMOGRAM: ICD-10-CM

## 2024-07-30 DIAGNOSIS — Z71.89 OTHER SPECIFIED COUNSELING: Primary | Chronic | ICD-10-CM

## 2024-07-30 DIAGNOSIS — N95.1 MENOPAUSAL SYNDROME (HOT FLASHES): ICD-10-CM

## 2024-07-30 LAB
ALBUMIN SERPL BCG-MCNC: 4.4 G/DL (ref 3.5–5.2)
ALP SERPL-CCNC: 41 U/L (ref 40–150)
ALT SERPL W P-5'-P-CCNC: 16 U/L (ref 0–50)
ANION GAP SERPL CALCULATED.3IONS-SCNC: 10 MMOL/L (ref 7–15)
AST SERPL W P-5'-P-CCNC: 24 U/L (ref 0–45)
BILIRUB SERPL-MCNC: 0.3 MG/DL
BUN SERPL-MCNC: 15.1 MG/DL (ref 6–20)
C TRACH DNA SPEC QL PROBE+SIG AMP: NEGATIVE
CALCIUM SERPL-MCNC: 9.1 MG/DL (ref 8.8–10.4)
CHLORIDE SERPL-SCNC: 105 MMOL/L (ref 98–107)
CHOLEST SERPL-MCNC: 264 MG/DL
CREAT SERPL-MCNC: 0.65 MG/DL (ref 0.51–0.95)
EGFRCR SERPLBLD CKD-EPI 2021: >90 ML/MIN/1.73M2
ERYTHROCYTE [DISTWIDTH] IN BLOOD BY AUTOMATED COUNT: 12.3 % (ref 10–15)
FASTING STATUS PATIENT QL REPORTED: YES
FASTING STATUS PATIENT QL REPORTED: YES
GLUCOSE SERPL-MCNC: 90 MG/DL (ref 70–99)
HBV SURFACE AB SERPL IA-ACNC: <3.5 M[IU]/ML
HBV SURFACE AB SERPL IA-ACNC: NONREACTIVE M[IU]/ML
HBV SURFACE AG SERPL QL IA: NONREACTIVE
HCO3 SERPL-SCNC: 26 MMOL/L (ref 22–29)
HCT VFR BLD AUTO: 42.5 % (ref 35–47)
HCV AB SERPL QL IA: NONREACTIVE
HDLC SERPL-MCNC: 80 MG/DL
HGB BLD-MCNC: 14.1 G/DL (ref 11.7–15.7)
HIV 1+2 AB+HIV1 P24 AG SERPL QL IA: NONREACTIVE
LDLC SERPL CALC-MCNC: 176 MG/DL
MCH RBC QN AUTO: 31.6 PG (ref 26.5–33)
MCHC RBC AUTO-ENTMCNC: 33.2 G/DL (ref 31.5–36.5)
MCV RBC AUTO: 95 FL (ref 78–100)
N GONORRHOEA DNA SPEC QL NAA+PROBE: NEGATIVE
NONHDLC SERPL-MCNC: 184 MG/DL
PLATELET # BLD AUTO: 228 10E3/UL (ref 150–450)
POTASSIUM SERPL-SCNC: 4.1 MMOL/L (ref 3.4–5.3)
PROT SERPL-MCNC: 7.3 G/DL (ref 6.4–8.3)
RBC # BLD AUTO: 4.46 10E6/UL (ref 3.8–5.2)
SODIUM SERPL-SCNC: 141 MMOL/L (ref 135–145)
T PALLIDUM AB SER QL: NONREACTIVE
TRIGL SERPL-MCNC: 41 MG/DL
TSH SERPL DL<=0.005 MIU/L-ACNC: 1.89 UIU/ML (ref 0.3–4.2)
VIT B12 SERPL-MCNC: 1441 PG/ML (ref 232–1245)
VIT D+METAB SERPL-MCNC: 46 NG/ML (ref 20–50)
WBC # BLD AUTO: 2.9 10E3/UL (ref 4–11)

## 2024-07-30 PROCEDURE — 87389 HIV-1 AG W/HIV-1&-2 AB AG IA: CPT | Performed by: NURSE PRACTITIONER

## 2024-07-30 PROCEDURE — 84443 ASSAY THYROID STIM HORMONE: CPT | Performed by: NURSE PRACTITIONER

## 2024-07-30 PROCEDURE — 87340 HEPATITIS B SURFACE AG IA: CPT | Performed by: NURSE PRACTITIONER

## 2024-07-30 PROCEDURE — 86706 HEP B SURFACE ANTIBODY: CPT | Performed by: NURSE PRACTITIONER

## 2024-07-30 PROCEDURE — 86780 TREPONEMA PALLIDUM: CPT | Performed by: NURSE PRACTITIONER

## 2024-07-30 PROCEDURE — 87491 CHLMYD TRACH DNA AMP PROBE: CPT | Performed by: NURSE PRACTITIONER

## 2024-07-30 PROCEDURE — 85027 COMPLETE CBC AUTOMATED: CPT | Performed by: NURSE PRACTITIONER

## 2024-07-30 PROCEDURE — 80053 COMPREHEN METABOLIC PANEL: CPT | Performed by: NURSE PRACTITIONER

## 2024-07-30 PROCEDURE — 82306 VITAMIN D 25 HYDROXY: CPT | Performed by: NURSE PRACTITIONER

## 2024-07-30 PROCEDURE — 82607 VITAMIN B-12: CPT | Performed by: NURSE PRACTITIONER

## 2024-07-30 PROCEDURE — 99213 OFFICE O/P EST LOW 20 MIN: CPT | Mod: 25 | Performed by: NURSE PRACTITIONER

## 2024-07-30 PROCEDURE — 80061 LIPID PANEL: CPT | Performed by: NURSE PRACTITIONER

## 2024-07-30 PROCEDURE — 86803 HEPATITIS C AB TEST: CPT | Performed by: NURSE PRACTITIONER

## 2024-07-30 PROCEDURE — 99396 PREV VISIT EST AGE 40-64: CPT | Performed by: NURSE PRACTITIONER

## 2024-07-30 PROCEDURE — 36415 COLL VENOUS BLD VENIPUNCTURE: CPT | Performed by: NURSE PRACTITIONER

## 2024-07-30 PROCEDURE — 87591 N.GONORRHOEAE DNA AMP PROB: CPT | Performed by: NURSE PRACTITIONER

## 2024-07-30 RX ORDER — VALACYCLOVIR HYDROCHLORIDE 500 MG/1
500 TABLET, FILM COATED ORAL 2 TIMES DAILY
Qty: 18 TABLET | Refills: 5 | Status: ON HOLD | OUTPATIENT
Start: 2024-07-30 | End: 2024-09-26

## 2024-07-30 SDOH — SOCIAL STABILITY - SOCIAL INSECURITY: DISRUPTION OF FAMILY BY SEPARATION AND DIVORCE: Z63.5

## 2024-07-30 NOTE — PATIENT INSTRUCTIONS
Patient Education   Preventive Care Advice   This is general advice given by our system to help you stay healthy. However, your care team may have specific advice just for you. Please talk to your care team about your preventive care needs.  Nutrition  Eat 5 or more servings of fruits and vegetables each day.  Try wheat bread, brown rice and whole grain pasta (instead of white bread, rice, and pasta).  Get enough calcium and vitamin D. Check the label on foods and aim for 100% of the RDA (recommended daily allowance).  Lifestyle  Exercise at least 150 minutes each week  (30 minutes a day, 5 days a week).  Do muscle strengthening activities 2 days a week. These help control your weight and prevent disease.  No smoking.  Wear sunscreen to prevent skin cancer.  Have a dental exam and cleaning every 6 months.  Yearly exams  See your health care team every year to talk about:  Any changes in your health.  Any medicines your care team has prescribed.  Preventive care, family planning, and ways to prevent chronic diseases.  Shots (vaccines)   HPV shots (up to age 26), if you've never had them before.  Hepatitis B shots (up to age 59), if you've never had them before.  COVID-19 shot: Get this shot when it's due.  Flu shot: Get a flu shot every year.  Tetanus shot: Get a tetanus shot every 10 years.  Pneumococcal, hepatitis A, and RSV shots: Ask your care team if you need these based on your risk.  Shingles shot (for age 50 and up)  General health tests  Diabetes screening:  Starting at age 35, Get screened for diabetes at least every 3 years.  If you are younger than age 35, ask your care team if you should be screened for diabetes.  Cholesterol test: At age 39, start having a cholesterol test every 5 years, or more often if advised.  Bone density scan (DEXA): At age 50, ask your care team if you should have this scan for osteoporosis (brittle bones).  Hepatitis C: Get tested at least once in your life.  STIs (sexually  transmitted infections)  Before age 24: Ask your care team if you should be screened for STIs.  After age 24: Get screened for STIs if you're at risk. You are at risk for STIs (including HIV) if:  You are sexually active with more than one person.  You don't use condoms every time.  You or a partner was diagnosed with a sexually transmitted infection.  If you are at risk for HIV, ask about PrEP medicine to prevent HIV.  Get tested for HIV at least once in your life, whether you are at risk for HIV or not.  Cancer screening tests  Cervical cancer screening: If you have a cervix, begin getting regular cervical cancer screening tests starting at age 21.  Breast cancer scan (mammogram): If you've ever had breasts, begin having regular mammograms starting at age 40. This is a scan to check for breast cancer.  Colon cancer screening: It is important to start screening for colon cancer at age 45.  Have a colonoscopy test every 10 years (or more often if you're at risk) Or, ask your provider about stool tests like a FIT test every year or Cologuard test every 3 years.  To learn more about your testing options, visit:   .  For help making a decision, visit:   https://bit.ly/dl88889.  Prostate cancer screening test: If you have a prostate, ask your care team if a prostate cancer screening test (PSA) at age 55 is right for you.  Lung cancer screening: If you are a current or former smoker ages 50 to 80, ask your care team if ongoing lung cancer screenings are right for you.  For informational purposes only. Not to replace the advice of your health care provider. Copyright   2023 [employer]. All rights reserved. Clinically reviewed by the [facility] Transitions Program. shoply 478147 - REV 01/24.

## 2024-07-30 NOTE — PROGRESS NOTES
Clinic Care Coordination Contact  [facility]/Voicemail    Clinical Data: Care Coordinator Outreach    Outreach Documentation Number of Outreach Attempt   7/30/2024  12:53 PM 1     Left message on patient's voicemail with call back information and requested return call.    Plan: Care Coordinator will try to reach patient again in 1-2 business days.    Yudy Dyer  Community Health Worker   [email or web address]   Clinic Care Coordination / Ambulatory Care Management  [facility], and [facility]  [email or web address]  Office: [phone]  Gender Pronouns: She/her/hers  Employed by [facility]

## 2024-07-30 NOTE — Clinical Note
Allan Blanco,  Please disregard my previous message.   Patient just called me back and wants to be enrolled with CC, so I will schedule CCC SW Assessment for this Friday, 8/2/24 at 2pm.  Thank you,  Yudy

## 2024-07-30 NOTE — PROGRESS NOTES
Preventive Care Visit  [facility] [place]  MAC Wright CNP, Nurse Practitioner - Family  Jul 30, 2024    Assessment & Plan     Routine general medical examination at a health care facility  Well woman exam with breast exam completed today.    Fasting labs today.    Will notify of lab results.     HSV-2 infection  Would like to try as needed treatment.   - valACYclovir (VALTREX) 500 MG tablet  Dispense: 18 tablet; Refill: 5    Menopausal syndrome (hot flashes)  Discussed options including birth control, low-dose antidepressant/SSRI, OTC herbal remedies.    She would like to consult with her naturopath/chiropractor on options at this time.    - Comprehensive metabolic panel  - TSH with free T4 reflex  - Comprehensive metabolic panel  - TSH with free T4 reflex    Screening for STDs (sexually transmitted diseases)    - Chlamydia trachomatis/Neisseria gonorrhoeae by PCR - lab collect  - Treponema Abs w Reflex to RPR and Titer  - HIV Antigen Antibody Combo  - Hepatitis B Surface Antibody  - Hepatitis B surface antigen  - Hepatitis C antibody  - Chlamydia trachomatis/Neisseria gonorrhoeae by PCR - lab collect  - Treponema Abs w Reflex to RPR and Titer  - HIV Antigen Antibody Combo  - Hepatitis C antibody    Dry lips      Syrinx of spinal cord (H)      Screen for colon cancer    - Colonoscopy Screening  Referral    Screening for thyroid disorder    - TSH with free T4 reflex  - TSH with free T4 reflex    Screening for deficiency anemia    - CBC with platelets  - CBC with platelets    Screening for hyperlipidemia    - Lipid panel reflex to direct LDL Fasting  - Lipid panel reflex to direct LDL Fasting    Visit for screening mammogram      Medication management    - Comprehensive metabolic panel  - Comprehensive metabolic panel    Screening for diabetes mellitus    - Comprehensive metabolic panel  - Comprehensive metabolic panel    Vitamin B12 deficiency (non anemic)    - Vitamin B12  -  "Vitamin B12    Vitamin D deficiency    - Vitamin D Deficiency  - Vitamin D Deficiency      Patient has been advised of split billing requirements and indicates understanding: Yes        BMI  Estimated body mass index is 26.98 kg/m  as calculated from the following:    Height as of this encounter: 1.702 m (5' 7.01\").    Weight as of this encounter: 78.2 kg (172 lb 4.8 oz).       Counseling  Appropriate preventive services were addressed with this patient.    Return in about 1 year (around 7/30/2025) for Wellness exam fasting labs.     Ramos Harris is a 54 year old, presenting for the following:  Physical    Going through the divorce.  Desires resources for food.  Desires STD testing needs before dating again.  Denies symptoms.    Dry lips; cream done from derm.  Menstrual period spreading out having hot flashes wonders if hormone testing is needed.      Does not want to be daily Valtrex given concern for liver long-term.  Has HSV 2.    Due for colonoscopy every 5 years had 9 MM polyp 2018.        7/30/2024     7:41 AM   Additional Questions   Roomed by Spring BENJI   Accompanied by Self        Health Care Directive  Patient does not have a Health Care Directive or Living Will: Discussed advance care planning with patient; however, patient declined at this time.    HPI        7/29/2024   General Health   How would you rate your overall physical health? Good   Feel stress (tense, anxious, or unable to sleep) Not at all            7/29/2024   Nutrition   Three or more servings of calcium each day? Yes   Diet: Carbohydrate counting   How many servings of fruit and vegetables per day? 4 or more   How many sweetened beverages each day? 0-1            7/29/2024   Exercise   Days per week of moderate/strenous exercise 6 days   Average minutes spent exercising at this level 60 min            7/29/2024   Social Factors   Frequency of gathering with friends or relatives Twice a week   Worry food won't last until get money " to buy more No   Food not last or not have enough money for food? No   Do you have housing? (Housing is defined as stable permanent housing and does not include staying ouside in a car, in a tent, in an abandoned building, in an overnight shelter, or couch-surfing.) Yes   Are you worried about losing your housing? No   Lack of transportation? No   Unable to get utilities (heat,electricity)? No            7/29/2024   Fall Risk   Fallen 2 or more times in the past year? No   Trouble with walking or balance? No             7/29/2024   Dental   Dentist two times every year? Yes            7/29/2024   TB Screening   Were you born outside of the [place]? No            Today's PHQ-2 Score:       7/29/2024     8:48 PM   PHQ-2 ( 1999 [employer])   Q1: Little interest or pleasure in doing things 0   Q2: Feeling down, depressed or hopeless 0   PHQ-2 Score 0   Q1: Little interest or pleasure in doing things Not at all   Q2: Feeling down, depressed or hopeless Not at all   PHQ-2 Score 0           7/29/2024   Substance Use   Alcohol more than 3/day or more than 7/wk No   Do you use any other substances recreationally? No        Social History     Tobacco Use    Smoking status: Never    Smokeless tobacco: Never    Tobacco comments:     no use ever   Vaping Use    Vaping status: Never Used   Substance Use Topics    Alcohol use: Not Currently     Comment: Occasionally    Drug use: No           9/6/2023   LAST FHS-7 RESULTS   1st degree relative breast or ovarian cancer No   Any relative bilateral breast cancer No   Any male have breast cancer No   Any ONE woman have BOTH breast AND ovarian cancer No   Any woman with breast cancer before 50yrs No   2 or more relatives with breast AND/OR ovarian cancer No   2 or more relatives with breast AND/OR bowel cancer No           Mammogram Screening - Mammogram every 1-2 years updated in Health Maintenance based on mutual decision making        7/29/2024   STI Screening   New sexual partner(s) since  "last STI/HIV test? No        History of abnormal Pap smear: No - age 30- 64 PAP with HPV every 5 years recommended        Latest Ref Rng & Units 9/15/2023     8:40 AM 9/27/2019    11:39 AM 9/27/2019    11:37 AM   PAP / HPV   PAP  Negative for Intraepithelial Lesion or Malignancy (NILM)      PAP (Historical)   OTHER-NIL, See Result     HPV 16 DNA Negative Negative   Negative    HPV 18 DNA Negative Negative   Negative    Other HR HPV Negative Negative   Negative      ASCVD Risk   The 10-year ASCVD risk score (Elmo KIM, et al., 2019) is: 1.4%    Values used to calculate the score:      Age: 54 years      Sex: Female      Is Non- : No      Diabetic: No      Tobacco smoker: No      Systolic Blood Pressure: 110 mmHg      Is BP treated: No      HDL Cholesterol: 75 mg/dL      Total Cholesterol: 273 mg/dL        Reviewed and updated as needed this visit by Provider   Tobacco  Allergies  Meds  Problems  Med Hx  Surg Hx  Fam Hx            Constitutional, HEENT, cardiovascular, pulmonary, GI, , musculoskeletal, neuro, skin, endocrine and psych systems are negative, except as otherwise noted in the HPI.        Objective    Exam  /60   Pulse 78   Temp 97.5  F (36.4  C) (Tympanic)   Resp 16   Ht 1.702 m (5' 7.01\")   Wt 78.2 kg (172 lb 4.8 oz)   SpO2 97%   BMI 26.98 kg/m     Estimated body mass index is 26.98 kg/m  as calculated from the following:    Height as of this encounter: 1.702 m (5' 7.01\").    Weight as of this encounter: 78.2 kg (172 lb 4.8 oz).    Physical Exam  GENERAL: alert and no distress  EYES: Eyes grossly normal to inspection, PERRL and conjunctivae and sclerae normal  HENT: ear canals and TM's normal, nose and mouth without ulcers or lesions  NECK: no adenopathy, no asymmetry, masses, or scars  RESP: lungs clear to auscultation - no rales, rhonchi or wheezes  BREAST: normal without masses, tenderness or nipple discharge and no palpable axillary masses or " adenopathy  CV: regular rate and rhythm, normal S1 S2, no S3 or S4, no murmur, click or rub, no peripheral edema  ABDOMEN: soft, nontender, no hepatosplenomegaly, no masses and bowel sounds normal  MS: no gross musculoskeletal defects noted, no edema  SKIN: no suspicious lesions or rashes  NEURO: Normal strength and tone, mentation intact and speech normal  PSYCH: mentation appears normal, affect normal/bright         Signed Electronically by: MAC Wright CNP

## 2024-07-31 ENCOUNTER — TELEPHONE (OUTPATIENT)
Dept: FAMILY MEDICINE | Facility: CLINIC | Age: 54
End: 2024-07-31

## 2024-07-31 NOTE — TELEPHONE ENCOUNTER
Forms/Letter Request    Type of form/letter: Biometric Screening      Do we have the form/letter: Yes: Let's Get Checked    Who is the form from? Insurance comp    Where did/will the form come from? Patient or family brought in       When is form/letter needed by: ASAP    How would you like the form/letter returned: Fax : [phone]    Patient Notified form requests are processed in 5-7 business days:Yes    Could we send this information to you in [facility] or would you prefer to receive a phone call?:   After faxing scan into pt's chart    Completed what I could - brought to provider to sign

## 2024-07-31 NOTE — PROGRESS NOTES
Clinic Care Coordination Contact  Community Health Worker Initial Outreach    CHW Initial Information Gathering:  Referral Source: PCP  Preferred Hospital: [facility]  [phone]  Preferred Urgent Care: [facility], [phone]  Current living arrangement:: I live in a private home with family  Type of residence:: Private home - stairs (With son)  Community Resources: None  Supplies Currently Used at Home: None  Equipment Currently Used at Home: none  Informal Support system:: Isa based, Neighbors  No PCP office visit in Past Year: No  Transportation means:: Regular car  CHW Additional Questions  If ED/Hospital discharge, follow-up appointment scheduled as recommended?: N/A  Medication changes made following ED/Hospital discharge?: N/A  MyChart active?: Yes  Patient sent Social Determinants of Health questionnaire?: No    Patient accepts CC: Yes. Patient scheduled for assessment with  CC on 8/2/24 at 2pm. Patient noted desire to discuss financial and food support/resources, legal support/resources for divorce, and 's outstanding medical bills.     Patient stated that she and her  will begin their dissolution of marriage in September 2024 and would like legal support/resources for the divorce proceedings.     Patient reported to CHW that  will move out of the house and patient and their son (over 18 years old) will continue to live in the house.      hasn't worked for the past 5 years and hasn't earned any substantial income. Patient would like food support/resources. Patient is also concerned about 's medical bills from the surgery he had in Fall, 2023 (currently balance of over $1,000). CHW informed patient about [facility] Care program and its process. Per patient's request, CHW placed a referral to FRW.     Patient also has a daughter who is over 18 years old and currently resides elsewhere. Patient noted that she still  support her daughter financially; however, anticipates it will be very difficult to continue to do so due to reasons stated above.     In terms of transportation, patient has a car and doesn't have any transportation issue.     Yudy Dyer  Community Health Worker   [email or web address]   Clinic Care Coordination / Ambulatory Care Management  [facility], and [facility]  [email or web address]  Office: [phone]  Gender Pronouns: She/her/hers  Employed by [employer]

## 2024-08-01 ENCOUNTER — PATIENT OUTREACH (OUTPATIENT)
Dept: CARE COORDINATION | Facility: CLINIC | Age: 54
End: 2024-08-01
Payer: COMMERCIAL

## 2024-08-02 ENCOUNTER — PATIENT OUTREACH (OUTPATIENT)
Dept: NURSING | Facility: CLINIC | Age: 54
End: 2024-08-02
Payer: COMMERCIAL

## 2024-08-02 ENCOUNTER — TELEPHONE (OUTPATIENT)
Dept: GASTROENTEROLOGY | Facility: CLINIC | Age: 54
End: 2024-08-02

## 2024-08-02 ASSESSMENT — ACTIVITIES OF DAILY LIVING (ADL): DEPENDENT_IADLS:: INDEPENDENT

## 2024-08-02 NOTE — PROGRESS NOTES
Clinic Care Coordination Contact  Clinic Care Coordination Contact  OUTREACH    Referral Information:  Referral Source: PCP    Primary Diagnosis: Psychosocial    Chief Complaint   Patient presents with    Clinic Care Coordination - Initial      Universal Utilization: No concerns identified at this time.  Clinic Utilization  Difficulty keeping appointments:: No  Compliance Concerns: No  No-Show Concerns: No  No PCP office visit in Past Year: No  Utilization      No Show Count (past year)  0             ED Visits  0             Hospital Admissions  0                    Current as of: 8/2/2024 12:19 PM            Clinical Concerns:  Current Medical Concerns:   Patient Active Problem List   Diagnosis    Herpes simplex virus (HSV) infection - genital    Intramural leiomyoma of uterus    Allergic rhinitis    Tear of meniscus of right knee as current injury, unspecified meniscus, unspecified tear type, initial encounter    History of adverse effect of anesthesia    HSV-2 infection    Screening for cervical cancer    Complex cyst of right ovary    Hypercholesteremia    Menorrhagia with regular cycle    Syrinx of spinal cord (H)     Current Behavioral Concerns: None reported or identified at this time    Education Provided to patient: Community resources.    Pain  Pain (GOAL):: No  Health Maintenance Reviewed: Up to date  Clinical Pathway: Defer to future outreach.     Medication Management:  Medication review status: Defer to follow-up with medical providers. Pt reported not taking any medications at this time. Pt denied difficulty with managing medications.     Current Outpatient Medications:     fish oil-omega-3 fatty acids 1000 MG capsule, Take 2 g by mouth daily, Disp: , Rfl:     ketoconazole (NIZORAL) 2 % external cream, Apply to affected and surrounding area(s) once daily until clinical resolution, typically 1 to 3 weeks, Disp: 30 g, Rfl: 1    multivitamin w/minerals (THERA-VIT-M) tablet, Take 1 tablet by mouth daily,  Disp: , Rfl:     Probiotic Product (PROBIOTIC-10 PO), , Disp: , Rfl:     valACYclovir (VALTREX) 500 MG tablet, Take 1 tablet (500 mg) by mouth 2 times daily for 3 days, Disp: 18 tablet, Rfl: 5    vitamin D3 (CHOLECALCIFEROL) 50 mcg (2000 units) tablet, Take 1 tablet (50 mcg) by mouth daily, Disp: , Rfl:      No Known Allergies     Functional Status:  Dependent ADLs:: Independent  Dependent IADLs:: Independent  Bed or wheelchair confined:: No  Mobility Status: Independent  Fallen 2 or more times in the past year?: No  Any fall with injury in the past year?: No    Living Situation:  Current living arrangement:: I live in a private home with family ( moving out. Will live with +17 y/o son.)  Type of residence:: Private home - [facility] (With son)    Lifestyle & Psychosocial Needs:    Social Determinants of Health     Food Insecurity: Low Risk  (7/29/2024)    Food Insecurity     Within the past 12 months, did you worry that your food would run out before you got money to buy more?: No     Within the past 12 months, did the food you bought just not last and you didn t have money to get more?: No   Depression: Not at risk (7/30/2024)    PHQ-2     PHQ-2 Score: 0   Housing Stability: Low Risk  (7/29/2024)    Housing Stability     Do you have housing? : Yes     Are you worried about losing your housing?: No   Tobacco Use: Low Risk  (7/30/2024)    Patient History     Smoking Tobacco Use: Never     Smokeless Tobacco Use: Never     Passive Exposure: Not on file   Financial Resource Strain: Low Risk  (7/29/2024)    Financial Resource Strain     Within the past 12 months, have you or your family members you live with been unable to get utilities (heat, electricity) when it was really needed?: No   Alcohol Use: Unknown (3/30/2023)    Received from [facility]    AUDIT-C     Frequency of Alcohol Consumption: Patient declined     Average Number of Drinks: Not on file     Frequency of Binge Drinking: Not on file   Transportation  Needs: Low Risk  (7/29/2024)    Transportation Needs     Within the past 12 months, has lack of transportation kept you from medical appointments, getting your medicines, non-medical meetings or appointments, work, or from getting things that you need?: No   Physical Activity: Sufficiently Active (7/29/2024)    Exercise Vital Sign     Days of Exercise per Week: 6 days     Minutes of Exercise per Session: 60 min   Interpersonal Safety: Low Risk  (7/30/2024)    Interpersonal Safety     Do you feel physically and emotionally safe where you currently live?: Yes     Within the past 12 months, have you been hit, slapped, kicked or otherwise physically hurt by someone?: No     Within the past 12 months, have you been humiliated or emotionally abused in other ways by your partner or ex-partner?: No   Stress: No Stress Concern Present (7/29/2024)    [employer] [place] [employer] - Occupational Stress Questionnaire     Feeling of Stress : Not at all   Social Connections: Unknown (7/29/2024)    Social Connection and Isolation Panel [NHANES]     Frequency of Communication with Friends and Family: Not on file     Frequency of Social Gatherings with Friends and Family: Twice a week     Attends Sikhism Services: Not on file     Active Member of Clubs or Organizations: Not on file     Attends Club or Organization Meetings: Not on file     Marital Status: Not on file   Health Literacy: Not on file     Diet:: Regular  Inadequate nutrition (GOAL):: No  Tube Feeding: No  Inadequate activity/exercise (GOAL):: No  Significant changes in sleep pattern (GOAL): No  Transportation means:: Regular car     Sikhism or spiritual beliefs that impact treatment:: No  Mental health DX:: No  Mental health management concern (GOAL):: No  Chemical Dependency Status: No Current Concerns  Informal Support system:: Isa based, Neighbors, Friends     Called and spoke with pt at scheduled time. Introduced self, SW CC role, CC program, and  discussed resources needed. Pt seeking legal assistance for upcoming/pending divorce, food resources, financial assistance, and resources to A with cost of home repairs (furnace and air conditioner). CHW placed FRW referral and outreach attempted by FRW yesterday, but unsuccessful. Notified pt of attempted outreach and FRW plan to outreach again next week. Discussed some resources and made a plan to send resources and links to pt directly via [employer] message. Pt in agreement with plan. Pt agreeable to CC enrollment and ongoing support from CC. Care plan discussed, created, and sent to pt, as well as a introduction letter to CC via [employer]. Pt expressed surprise with CC service from a PCP clinic, in a good way, and expressed appreciation for call and support. Pt denied additional needs at this time. Will remain available.     Documented also in CHW Yudy's note, pt reported that her  has not moved out yet, but is in the process. Pt lives with her adult son and identified support from friends and neighbors. 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 Pt denied current or hx of mental health or substance use/abuse concerns. Pt sounded alert, orient, and pleasant.     Information sent to patient:   Legal Assistance   -SMRLS--https://www.smrls.org/  -Legal Services State Support--https://www.lawhelpmn.org/. PH: [phone]   -Volunteer  Network--https://www.[facility].org/help  -[facility] --Find out if you qualify for free . For more information call the intake line at 1-947.336.9585 or [phone].    Food Resources - Here is an enrollment form access the [employer] program. This program provides an $80 dollar credit each month that you can spend at the [employer] (a grocery store on a bus) or Fare for All (pop-up sales of grocery bundles). Click this link to enroll: [employer] Enrollment Survey    Here is some more information about the  program:  Fare For All Schedule - [employer] (thefoodgroupmn.org)  Once the survey is completed, your name will be placed on a list and you will receive a packet of information in the mail. When you go to the location you choose, they will have your information available.     Home Repair Support  DARTS ([facility], but maybe still worth contacting)--https://dartsconnects.org/  [employer] [facility] 211 (may also have legal assistance options)--https://[address] [place] [address].[employer]/  Mnhelp.info--https://www.[facility].info/     Resources and Interventions:  Current Resources: SMRLS, [employer] and Fare for All, DARTS (although pt lives in [facility]), [facility], YHXPQ313, [employer] [facility] 2-1-1, and [facility]  [facility] Resources: None  Supplies Currently Used at Home: None  Equipment Currently Used at Home: none  Employment Status: employed full-time    Advance Care Plan/Directive  Advanced Care Plans/Directives on file:: No     Care Plan:  Care Plan: Community Resources       Problem: HP GENERAL PROBLEM       Goal: Community Resources       Start Date: 8/2/2024 Expected End Date: 12/2/2024    Note:     Barriers: Divorce, outstanding bills, and financial concerns   Strengths: Engaged in Care Coordination  Patient expressed understanding of goal: Yes  Action steps to achieve this goal:  1. I will continue to rely on assistance from friends, neighbors, and family.   2. I will review the resources sent to me by care coordination: legal, financial, home repairs, and food resources.   3. I will work with financial resource worker (FRW) to assess eligibility for financial assistance.   4. I will continue to work with and contact the clinic for additional resources and support.                                 Patient/Caregiver understanding: yes    Outreach Frequency: monthly, more frequently as needed  Future Appointments                In 1 month [address], [facility]             Plan: CHW outreach in 30 days. This writer will remain available and continue to follow.     Bella [facility]  Primary Care Social Work Care Coordinator  [facility] [place]   PH: [phone]

## 2024-08-02 NOTE — LETTER
[facility] CARE COORDINATION  [address] [address]    August 2, 2024    Kimberly Rankin  [address] [address]      Dear Kimberly,    I am a clinic care coordinator who works with MAC Wright CNP with the [facility]. I wanted to thank you for spending the time to talk with me.  I will send you some resources that we discussed via message with direct links for where you can research and evaluate further. Below is a description of clinic care coordination and how I can further assist you.       The clinic care coordination team is made up of a registered nurse, , financial resource worker and community health worker who understand the health care system. The goal of clinic care coordination is to help you manage your health and improve access to the health care system. Our team works alongside your provider to assist you in determining your health and social needs. We can help you obtain health care and community resources, providing you with necessary information and education. We can work with you through any barriers and develop a care plan that helps coordinate and strengthen the communication between you and your care team.  Our services are voluntary and are offered without charge to you personally.    Please feel free to contact me with any questions or concerns regarding care coordination and what we can offer.      We are focused on providing you with the highest-quality healthcare experience possible.    Sincerely,     Bella Benjamin [facility]  Primary Care Social Work Care Coordinator  [facility], & [place]   PH: [phone]      ----------------------------------------------------------------------------------------------------------------------  Enclosed: WHAT IS CARE COORDINATION?     [facility] Care Coordination supports patients and families dealing with chronic or complex health  conditions, developmental issues, and social service needs. This service is available to patients of all ages, from babies to seniors. When you're facing a difficult decision about caring for yourself or someone you love, we can help you understand your options. We identify and refer you to community resources that help with financial, legal, mental health, transportation, and other issues. We also help with your medical and related education needs.     IS CARE COORDINATION RIGHT FOR ME?   Discuss a referral to Care Coordination with your primary care provider or care team member.     HOW CAN I CONNECT WITH CARE COORDINATION?    Contact your clinic.  Speak with your doctor or clinic staff.  Discuss care coordination with hospital staff before discharge.     MEET YOUR CARE COORDINATION TEAM:  Registered Nurse Care Coordinator    Provides education on medications, disease management, and new diagnoses.  Addresses concerns about medical conditions and connects you to resources.  Develops patient-centered goals and communicates with patient's care team.     Social Work Care Coordinator    Provides education on emotional wellbeing.  Connects you to a variety of community-based resources.  Communicates with care team and community partners.     Community Health Worker    Identifies health and social barriers and connects you with community resources.  Develops nonclinical patient and family centered goals.  Enhances communication between patients and care teams.     Financial Resource Worker    Assists patients with applying for health [place] ([place], [place], [place]).  Helps patients with applying for county benefits.  Connects patients with [facility].      [facility]  Patient Centered Plan of Care  About Me:        Patient Name:  Kimberly Rankin    YOB: 1970  Age:         54 year old   [place] MRN:    [id number] Telephone Information:  Home Phone [phone]    Mobile [phone]       Address:  [address] [address] Email address:  [email or web address]      Emergency Contact(s)    Name Relationship Lgl Grd Work Phone Home Phone Mobile Phone   LUIS FELIPE CORRIGAN Spouse   [phone]            Primary language:  English     needed? No   [facility] [employer]:  [phone] op. 1  Other communication barriers:None    Preferred Method of Communication:  Jacques  Current living arrangement: I live in a private home with family ( moving out. Will live with +19 y/o son.)    Mobility Status/ Medical Equipment: Independent    Health Maintenance  Health Maintenance Reviewed: Up to date    My Access Plan  Medical Emergency 911   Primary Clinic Line [facility] [phone]   24 Hour Appointment Line [phone] or  [phone]) (toll-free)   24 Hour Nurse Line [phone] (toll-free)   Preferred Urgent Care [facility] [phone]     [facility]  [phone]     Preferred Pharmacy [facility] [address] [place]  [place], [place] - [address]     Behavioral Health Crisis Line The National Suicide Prevention Lifeline at 1-936.688.9514 or Text/Call 688     My Care Team Members  Patient Care Team         Relationship Specialty Notifications Start End    Milvia Zaman APRN CNP PCP - General   9/5/23     Phone: [phone] Fax: [phone]         [address] [place] [address]    Milvia Zaman APRN CNP Assigned PCP   10/21/23     Phone: [phone] Fax: [phone] [address] [address]    Bella Benjamin LICSW Lead Care Coordinator  Admissions 7/30/24     Phone: [phone] Pager: [phone]        Zaynab Palafox MA Financial Resource Worker   8/1/24     Phone: [phone]         Yudy Dyer CHW Community Health Worker  Admissions 8/2/24               My Care  Plans  Self Management and Treatment Plan    Care Plan  Care Plan: Community Resources       Problem: HP GENERAL PROBLEM       Goal: Community Resources       Start Date: 8/2/2024 Expected End Date: 12/2/2024    Note:     Barriers: Divorce, outstanding bills, and financial concerns   Strengths: Engaged in Care Coordination  Patient expressed understanding of goal: Yes  Action steps to achieve this goal:  1. I will continue to rely on assistance from friends, neighbors, and family.   2. I will review the resources sent to me by care coordination: legal, financial, home repairs, and food resources.   3. I will work with financial resource worker (FRW) to assess eligibility for financial assistance.   4. I will continue to work with and contact the clinic for additional resources and support.                             Advance Care Plans/Directives:   Advanced Care Plan/Directives on file:   No    Discussed with patient/caregiver(s): No data recorded             My Medical and Care Information  Problem List   Patient Active Problem List   Diagnosis    Herpes simplex virus (HSV) infection - genital    Intramural leiomyoma of uterus    Allergic rhinitis    Tear of meniscus of right knee as current injury, unspecified meniscus, unspecified tear type, initial encounter    History of adverse effect of anesthesia    HSV-2 infection    Screening for cervical cancer    Complex cyst of right ovary    Hypercholesteremia    Menorrhagia with regular cycle    Syrinx of spinal cord (H)      Current Medications and Allergies:  See printed Medication Report.      Current Outpatient Medications:     fish oil-omega-3 fatty acids 1000 MG capsule, Take 2 g by mouth daily, Disp: , Rfl:     ketoconazole (NIZORAL) 2 % external cream, Apply to affected and surrounding area(s) once daily until clinical resolution, typically 1 to 3 weeks, Disp: 30 g, Rfl: 1    multivitamin w/minerals (THERA-VIT-M) tablet, Take 1 tablet by mouth daily, Disp: ,  Rfl:     Probiotic Product (PROBIOTIC-10 PO), , Disp: , Rfl:     valACYclovir (VALTREX) 500 MG tablet, Take 1 tablet (500 mg) by mouth 2 times daily for 3 days, Disp: 18 tablet, Rfl: 5    vitamin D3 (CHOLECALCIFEROL) 50 mcg (2000 units) tablet, Take 1 tablet (50 mcg) by mouth daily, Disp: , Rfl:      No Known Allergies    Care Coordination Start Date: 7/30/2024   Frequency of Care Coordination: monthly, more frequently as needed     Form Last Updated: 08/02/2024

## 2024-08-02 NOTE — TELEPHONE ENCOUNTER
"Endoscopy Scheduling Screen    Have you had a positive Covid test in the last 14 days?  No    What is your communication preference for Instructions and/or Bowel Prep?   MyChart    What insurance is in the chart?  Other:  [employer]    Ordering/Referring Provider: Milvia Zaman APRN CNP in  FAMILY PRACTICE     (If ordering provider performs procedure, schedule with ordering provider unless otherwise instructed. )    BMI: Estimated body mass index is 26.98 kg/m  as calculated from the following:    Height as of 7/30/24: 1.702 m (5' 7.01\").    Weight as of 7/30/24: 78.2 kg (172 lb 4.8 oz).     Sedation Ordered  moderate sedation.   If patient BMI > 50 do not schedule in [facility].    If patient [facility] > 45 [facility]    Are you taking methadone or Suboxone?  No    Have you had difficulties, pain, or discomfort during past endoscopy procedures?  No    Are you taking any prescription medications for pain 3 or more times per week?   NO, No RN review required.    Do you have a history of malignant hyperthermia?  No    (Females) Are you currently pregnant?   No     Have you been diagnosed or told you have pulmonary hypertension?   No    Do you have an LVAD?  No    Have you been told you have moderate to severe sleep apnea?  No    Have you been told you have COPD, asthma, or any other lung disease?  No    Do you have any heart conditions?  No     Have you ever had or are you waiting for an organ transplant?  No. Continue scheduling, no site restrictions.    Have you had a stroke or transient ischemic attack (TIA aka \"mini stroke\" in the last 6 months?   No    Have you been diagnosed with or been told you have cirrhosis of the liver?   No    Are you currently on dialysis?   No    Do you need assistance transferring?   No    BMI: Estimated body mass index is 26.98 kg/m  as calculated from the following:    Height as of 7/30/24: 1.702 m (5' 7.01\").    Weight as of 7/30/24: 78.2 kg (172 lb 4.8 oz).     Is patients BMI " > 40 and scheduling location [facility]?  No    Do you take an injectable medication for weight loss or diabetes (excluding insulin)?  No    Do you take the medication Naltrexone?  No    Do you take blood thinners?  No       Prep   Are you currently on dialysis or do you have chronic kidney disease?  No    Do you have a diagnosis of diabetes?  No    Do you have a diagnosis of cystic fibrosis (CF)?  No    On a regular basis do you go 3 -5 days between bowel movements?  No    BMI > 40?  No    Preferred Pharmacy:      [facility] [address] [place] [address]  [address] [place] [address]  Phone: [phone] Fax: [phone]      Final Scheduling Details     Procedure scheduled  Colonoscopy    Surgeon:  SANDY     Date of procedure:  9/26     Pre-OP / PAC:   No - Not required for this site.    Location  [facility] - Patient preference.    Sedation   Moderate Sedation - Per order.      Patient Reminders:   You will receive a call from a Nurse to review instructions and health history.  This assessment must be completed prior to your procedure.  Failure to complete the Nurse assessment may result in the procedure being cancelled.      On the day of your procedure, please designate an adult(s) who can drive you home stay with you for the next 24 hours. The medicines used in the exam will make you sleepy. You will not be able to drive.      You cannot take public transportation, ride share services, or non-medical taxi service without a responsible caregiver.  Medical transport services are allowed with the requirement that a responsible caregiver will receive you at your destination.  We require that drivers and caregivers are confirmed prior to your procedure.

## 2024-08-04 NOTE — RESULT ENCOUNTER NOTE
Dear Kimberly,    Here is a summary of your recent test results:    -LDL(bad) cholesterol level is elevated which can increase your heart disease risk.  A diet high in fat and simple carbohydrates, genetics and being overweight can contribute to this. ADVISE: exercising and eating a low saturated fat/low carbohydrate diet are helpful to improve this. Current guidelines from the American Heart Association support starting a cholesterol lowering medication to lower your heart and stroke disease risk.  I am recommending a statin prescription to your pharmacy. If you would like the prescription filled and if you have concerns about this recommendation then please contact me.    You an decrease our vitamin b12 supplements in half.      For additional lab test information, labtestsonline.org is an excellent reference.    In addition, here is a list of due or overdue Health Maintenance reminders:    Colorectal Cancer Screening due on 12/24/2023    Please call us at [phone] (or use Pelago) to address the above recommendations if needed.    Thank you for choosing [facility].  It was an honor and a privilege to participate in your care.       Healthy regards,    Milvia Zaman, FARRUKH  [facility]

## 2024-08-08 ENCOUNTER — PATIENT OUTREACH (OUTPATIENT)
Dept: CARE COORDINATION | Facility: CLINIC | Age: 54
End: 2024-08-08
Payer: COMMERCIAL

## 2024-08-29 ENCOUNTER — PATIENT OUTREACH (OUTPATIENT)
Dept: CARE COORDINATION | Facility: CLINIC | Age: 54
End: 2024-08-29
Payer: COMMERCIAL

## 2024-08-29 NOTE — PROGRESS NOTES
Clinic Care Coordination Contact  [facility]/Voicemail    Clinical Data: Care Coordinator Outreach    Outreach Documentation Number of Outreach Attempt   8/29/2024   3:21 PM 1     Left message on patient's voicemail with call back information and requested return call.    Plan: Care Coordinator will try to reach patient again in 3-5 business days.    Bella Benjamin [facility]  Primary Care Social Work Care Coordinator  [facility], & [place]   PH: [phone]

## 2024-09-03 NOTE — PROGRESS NOTES
Clinic Care Coordination Contact  Brief Entry:     Called pt today. Pt answered. Asked if this was a good time to touch base and pt requested a return call next week. Pt stated that she prefers a return call on Fri 09/13/24. Moved outreach task and will follow-up. Encouraged pt to call this writer in the meantime if needed. Pt denied any immediate needs, questions, or concerns at this time. CC available if needed.     Bella Benjamin [facility]  Primary Care Social Work Care Coordinator  [facility], & [place]   PH: [phone]

## 2024-09-05 NOTE — TELEPHONE ENCOUNTER
Standard Miralax Bowel Prep recommended due to standard bowel prep. Instructions were sent via NOZA.

## 2024-09-13 ENCOUNTER — PATIENT OUTREACH (OUTPATIENT)
Dept: CARE COORDINATION | Facility: CLINIC | Age: 54
End: 2024-09-13
Payer: COMMERCIAL

## 2024-09-13 NOTE — PROGRESS NOTES
"Clinic Care Coordination Contact  Follow Up Progress Note      Assessment: Called pt today as planned/requested. Pt stated that things are going well and that she is \"still working and still \". Asked if pt received resources sent to her via Yelp after initial CC assessment. Pt stated that she did but that she doesn't think she will qualify for any resources, given her income. CC informed pt that most of those resources do not take in consideration her income and that she should still be able to access the information and resources provided. Pt asked what address CC sent them to. Reminded pt that they were sent via Yelp. Pt was able to pull up the information on her phone and confirmed that she received the message. Offered additional resources and pt politely declined. Pt denied any needs, questions, or concerns. Inquired about ongoing CC check in support and pt politely declined and expressed having enough to look further in. Confirmed that pt has CC direct number and encouraged pt to reach out if needed. Call ended and CC program closed. No further outreaches planned.     Care Gaps:    Health Maintenance Due   Topic Date Due    COLORECTAL CANCER SCREENING  12/24/2023    INFLUENZA VACCINE (1) 09/01/2024     Care Plans: CC program closed and care plan ended.     Intervention/Education provided during outreach: Resources in the community     Plan: No further needs at this time and no planned outreaches by this writer. New CC referral can be entered if needed and CC will be available.     Bella Benjamin [facility]  Primary Care Social Work Care Coordinator  [facility], & [place]   PH: [phone]      "

## 2024-09-17 ENCOUNTER — TELEPHONE (OUTPATIENT)
Dept: GASTROENTEROLOGY | Facility: CLINIC | Age: 54
End: 2024-09-17
Payer: COMMERCIAL

## 2024-09-17 NOTE — TELEPHONE ENCOUNTER
Pre visit planning completed.      Procedure details:    Patient scheduled for Colonoscopy on 09.26.2024.     Arrival time: 0830. Procedure time 0915    Facility location: [facility]; [address] [place] [address]. Check in location: Main entrance, door #1 on the North side of the building under roundabout awning. DO NOT GO TO SURGERY/ED ENTRANCE.     Sedation type: Conscious sedation     Pre op exam needed? No.    Indication for procedure: Screen for colon cancer       Chart review:     Electronic implanted devices? No    Recent diagnosis of diverticulitis within the last 6 weeks? No      Medication review:    Diabetic? No    Anticoagulants? No    Weight loss medication/injectable? No GLP-1 medication per patient's medication list.  RN will verify with pre-assessment call.    Other medication HOLDING recommendations:  N/A      Prep for procedure:     Bowel prep recommendation: Standard Miralax  Due to: standard bowel prep.    Prep instructions sent via Livestation         Kaykay Pang RN  Endoscopy Procedure Pre Assessment RN  [phone] option 2

## 2024-09-18 NOTE — TELEPHONE ENCOUNTER
Attempted to contact patient in order to complete pre assessment questions.     No answer. Left message to return call to [phone] option 2    Callback required communication sent via [employer].      Kaykay Pang RN  Endoscopy Procedure Pre Assessment

## 2024-09-18 NOTE — TELEPHONE ENCOUNTER
Pre assessment completed for upcoming procedure.   (Please see previous telephone encounter notes for complete details)    Patient  returned call.       Procedure details:    Arrival time and facility location reviewed.    Pre op exam needed? No.    Designated  policy reviewed. Instructed to have someone stay 6  hours post procedure.       Medication review:    Medications reviewed. Please see supporting documentation below. Holding recommendations discussed (if applicable).       Prep for procedure:     Procedure prep instructions reviewed.        Any additional information needed:  N/A      Patient  verbalized understanding and had no questions or concerns at this time.      Ebonie Brennan RN  Endoscopy Procedure Pre Assessment   [phone] option 2

## 2024-09-26 ENCOUNTER — HOSPITAL ENCOUNTER (OUTPATIENT)
Dept: MAMMOGRAPHY | Facility: CLINIC | Age: 54
Discharge: HOME | End: 2024-09-26
Attending: NURSE PRACTITIONER | Admitting: INTERNAL MEDICINE
Payer: COMMERCIAL

## 2024-09-26 ENCOUNTER — PATIENT OUTREACH (OUTPATIENT)
Dept: GASTROENTEROLOGY | Facility: CLINIC | Age: 54
End: 2024-09-26
Payer: COMMERCIAL

## 2024-09-26 ENCOUNTER — HOSPITAL ENCOUNTER (OUTPATIENT)
Facility: CLINIC | Age: 54
Discharge: HOME | End: 2024-09-26
Attending: INTERNAL MEDICINE | Admitting: INTERNAL MEDICINE
Payer: COMMERCIAL

## 2024-09-26 VITALS
BODY MASS INDEX: 28.12 KG/M2 | HEART RATE: 62 BPM | HEIGHT: 66 IN | SYSTOLIC BLOOD PRESSURE: 109 MMHG | OXYGEN SATURATION: 98 % | WEIGHT: 175 LBS | RESPIRATION RATE: 14 BRPM | DIASTOLIC BLOOD PRESSURE: 83 MMHG

## 2024-09-26 DIAGNOSIS — Z12.31 VISIT FOR SCREENING MAMMOGRAM: ICD-10-CM

## 2024-09-26 LAB — COLONOSCOPY: NORMAL

## 2024-09-26 PROCEDURE — 250N000011 HC RX IP 250 OP 636: Performed by: INTERNAL MEDICINE

## 2024-09-26 PROCEDURE — G0105 COLORECTAL SCRN; HI RISK IND: HCPCS | Performed by: INTERNAL MEDICINE

## 2024-09-26 PROCEDURE — 45378 DIAGNOSTIC COLONOSCOPY: CPT | Performed by: INTERNAL MEDICINE

## 2024-09-26 PROCEDURE — G0500 MOD SEDAT ENDO SERVICE >5YRS: HCPCS | Performed by: INTERNAL MEDICINE

## 2024-09-26 PROCEDURE — 77063 BREAST TOMOSYNTHESIS BI: CPT

## 2024-09-26 RX ORDER — SIMETHICONE 40MG/0.6ML
133 SUSPENSION, DROPS(FINAL DOSAGE FORM)(ML) ORAL
Status: DISCONTINUED | OUTPATIENT
Start: 2024-09-26 | End: 2024-09-26 | Stop reason: HOSPADM

## 2024-09-26 RX ORDER — NALOXONE HYDROCHLORIDE 0.4 MG/ML
0.2 INJECTION, SOLUTION INTRAMUSCULAR; INTRAVENOUS; SUBCUTANEOUS
Status: DISCONTINUED | OUTPATIENT
Start: 2024-09-26 | End: 2024-09-26 | Stop reason: HOSPADM

## 2024-09-26 RX ORDER — ONDANSETRON 2 MG/ML
4 INJECTION INTRAMUSCULAR; INTRAVENOUS
Status: DISCONTINUED | OUTPATIENT
Start: 2024-09-26 | End: 2024-09-26 | Stop reason: HOSPADM

## 2024-09-26 RX ORDER — NALOXONE HYDROCHLORIDE 0.4 MG/ML
0.4 INJECTION, SOLUTION INTRAMUSCULAR; INTRAVENOUS; SUBCUTANEOUS
Status: DISCONTINUED | OUTPATIENT
Start: 2024-09-26 | End: 2024-09-26 | Stop reason: HOSPADM

## 2024-09-26 RX ORDER — FENTANYL CITRATE 50 UG/ML
50-100 INJECTION, SOLUTION INTRAMUSCULAR; INTRAVENOUS EVERY 5 MIN PRN
Status: DISCONTINUED | OUTPATIENT
Start: 2024-09-26 | End: 2024-09-26 | Stop reason: HOSPADM

## 2024-09-26 RX ORDER — DIPHENHYDRAMINE HYDROCHLORIDE 50 MG/ML
25-50 INJECTION INTRAMUSCULAR; INTRAVENOUS
Status: DISCONTINUED | OUTPATIENT
Start: 2024-09-26 | End: 2024-09-26 | Stop reason: HOSPADM

## 2024-09-26 RX ORDER — ONDANSETRON 2 MG/ML
4 INJECTION INTRAMUSCULAR; INTRAVENOUS EVERY 6 HOURS PRN
Status: DISCONTINUED | OUTPATIENT
Start: 2024-09-26 | End: 2024-09-26 | Stop reason: HOSPADM

## 2024-09-26 RX ORDER — ONDANSETRON 4 MG/1
4 TABLET, ORALLY DISINTEGRATING ORAL EVERY 6 HOURS PRN
Status: DISCONTINUED | OUTPATIENT
Start: 2024-09-26 | End: 2024-09-26 | Stop reason: HOSPADM

## 2024-09-26 RX ORDER — ATROPINE SULFATE 0.1 MG/ML
1 INJECTION INTRAVENOUS
Status: DISCONTINUED | OUTPATIENT
Start: 2024-09-26 | End: 2024-09-26 | Stop reason: HOSPADM

## 2024-09-26 RX ORDER — LIDOCAINE 40 MG/G
CREAM TOPICAL
Status: DISCONTINUED | OUTPATIENT
Start: 2024-09-26 | End: 2024-09-26 | Stop reason: HOSPADM

## 2024-09-26 RX ORDER — EPINEPHRINE 1 MG/ML
0.1 INJECTION, SOLUTION INTRAMUSCULAR; SUBCUTANEOUS
Status: DISCONTINUED | OUTPATIENT
Start: 2024-09-26 | End: 2024-09-26 | Stop reason: HOSPADM

## 2024-09-26 RX ORDER — FLUMAZENIL 0.1 MG/ML
0.2 INJECTION, SOLUTION INTRAVENOUS
Status: DISCONTINUED | OUTPATIENT
Start: 2024-09-26 | End: 2024-09-26 | Stop reason: HOSPADM

## 2024-09-26 RX ORDER — PROCHLORPERAZINE MALEATE 10 MG
10 TABLET ORAL EVERY 6 HOURS PRN
Status: DISCONTINUED | OUTPATIENT
Start: 2024-09-26 | End: 2024-09-26 | Stop reason: HOSPADM

## 2024-09-26 RX ADMIN — FENTANYL CITRATE 100 MCG: 50 INJECTION, SOLUTION INTRAMUSCULAR; INTRAVENOUS at 09:14

## 2024-09-26 RX ADMIN — MIDAZOLAM 2 MG: 1 INJECTION INTRAMUSCULAR; INTRAVENOUS at 09:14

## 2024-09-26 ASSESSMENT — ACTIVITIES OF DAILY LIVING (ADL): ADLS_ACUITY_SCORE: 35

## 2024-09-26 NOTE — H&P
Pre-Endoscopy History and Physical     Kimberly Rankin MRN# [id number]   YOB: 1970 Age: 54 year old     Date of Procedure: 9/26/2024  Primary care provider: Milvia Zaman  Type of Endoscopy: Colonoscopy with possible biopsy, possible polypectomy  Reason for Procedure: polyp  Type of Anesthesia Anticipated: Conscious Sedation    HPI:    Kimberly is a 54 year old female who will be undergoing the above procedure.      A history and physical has been performed. The patient's medications and allergies have been reviewed. The risks and benefits of the procedure and the sedation options and risks were discussed with the patient.  All questions were answered and informed consent was obtained.      She denies a personal or family history of anesthesia complications or bleeding disorders.     Patient Active Problem List   Diagnosis    Herpes simplex virus (HSV) infection - genital    Intramural leiomyoma of uterus    Allergic rhinitis    Tear of meniscus of right knee as current injury, unspecified meniscus, unspecified tear type, initial encounter    History of adverse effect of anesthesia    HSV-2 infection    Screening for cervical cancer    Complex cyst of right ovary    Hypercholesteremia    Menorrhagia with regular cycle    Syrinx of spinal cord (H)        Past Medical History:   Diagnosis Date    Abnormal involuntary movement 09/29/2009    Overview:  LW Modifier:  left shoulder and thigh LW Onset:  9/1/2009 ; Fasciculations Benign    Genital herpes     taking acyclovir regulary as a suppressive medicatino    HSV-2 infection     Otitis media     Patient reports having this a recurrent problem with colds    Overweight (BMI 25.0-29.9) 7/12/2019    Seasonal allergies     Dogs and Cats    Temporomandibular joint disorder (TMJ) 12/16/2009    Overview:  LW Modifier:  eval at [facility] LW Onset:  2009 ; Temporomandibular Joint Syndrome        Past Surgical History:   Procedure Laterality Date     "ARTHROSCOPY KNEE WITH MENISCAL REPAIR Left 07/2019    BACK SURGERY  7/2020    COLONOSCOPY  12/24/2018    Sessile adenomatous polyp removed - Colonscopy Q 5 years    HYSTEROSCOPY DIAGNOSTIC  11/09/2016    Office hysteroscopy at [facility] - Normal cavity    NECK SURGERY  07/2020    hemilamectomy       Social History     Tobacco Use    Smoking status: Never    Smokeless tobacco: Never    Tobacco comments:     no use ever   Substance Use Topics    Alcohol use: Not Currently     Comment: Occasionally       Family History   Problem Relation Age of Onset    Dementia Mother     Heart Disease Father     Dementia Father     Hypertension Father     Hyperlipidemia Father     Kidney Disease Daughter     Breast Cancer No family hx of     Colon Cancer No family hx of     Ovarian Cancer No family hx of        Prior to Admission medications    Medication Sig Start Date End Date Taking? Authorizing Provider   fish oil-omega-3 fatty acids 1000 MG capsule Take 2 g by mouth daily   Yes Reported, Patient   ketoconazole (NIZORAL) 2 % external cream Apply to affected and surrounding area(s) once daily until clinical resolution, typically 1 to 3 weeks 9/15/23  Yes Milvia Zaman, APRN CNP   multivitamin w/minerals (THERA-VIT-M) tablet Take 1 tablet by mouth daily   Yes Reported, Patient   Probiotic Product (PROBIOTIC-10 PO)    Yes Reported, Patient   vitamin D3 (CHOLECALCIFEROL) 50 mcg (2000 units) tablet Take 1 tablet (50 mcg) by mouth daily 10/4/21  Yes Aj Olson, DO       No Known Allergies     REVIEW OF SYSTEMS:   5 point ROS negative except as noted above in HPI, including Gen., Resp., CV, GI &  system review.    PHYSICAL EXAM:   There were no vitals taken for this visit. Estimated body mass index is 26.98 kg/m  as calculated from the following:    Height as of 7/30/24: 1.702 m (5' 7.01\").    Weight as of 7/30/24: 78.2 kg (172 lb 4.8 oz).   GENERAL APPEARANCE: alert, and oriented  MENTAL STATUS: alert  AIRWAY EXAM: " Mallampatti Class I (visualization of the soft palate, fauces, uvula, anterior and posterior pillars)  RESP: lungs clear to auscultation - no rales, rhonchi or wheezes  CV: regular rates and rhythm  DIAGNOSTICS:    Not indicated    IMPRESSION   ASA Class 2 - Mild systemic disease    PLAN:   Plan for Colonoscopy with possible biopsy, possible polypectomy. We discussed the risks, benefits and alternatives and the patient wished to proceed.    The above has been forwarded to the consulting provider.      Signed Electronically by: Tim Araujo MD  September 26, 2024

## 2024-09-26 NOTE — RESULT ENCOUNTER NOTE
Dear Kimberly,    Here is a summary of your recent test results:      Impression:               - The examined portion of the ileum was normal.                            - The entire examined colon is normal on direct and                            retroflexion views.                            - No specimens collected.  Recommendation:           - Repeat colonoscopy in 7 years for surveillance.          For additional lab test information, labtestsonline.org is an excellent reference.    In addition, here is a list of due or overdue Health Maintenance reminders:    Flu Vaccine(1) due on 09/01/2024  ANNUAL REVIEW OF  ORDERS due on 09/15/2024    Please call us at [phone] (or use [email or web address]) to address the above recommendations if needed.    Thank you for choosing [facility].  It was an honor and a privilege to participate in your care.       Healthy regards,    Milvia Zaman, FARRUKH  [facility]

## 2024-12-02 NOTE — PROGRESS NOTES
Short Stay Endoscopy History and Physical    PCP - Kiko Koch MD    Procedure - Colonoscopy  ASA - per anesthesia  Mallampati - per anesthesia  History of Anesthesia problems - no  Family history Anesthesia problems - no   Plan of anesthesia - General    HPI:  This is a 54 y.o. female here for evaluation of : followup of colon ulcer      ROS:  Constitutional: No fevers, chills, No weight loss  CV: No chest pain  Pulm: No cough, No shortness of breath  GI: see HPI  Derm: No rash    Medical History:  has a past medical history of Abnormal Pap smear; Abnormal Pap smear of cervix; Abnormal Pap smear of vagina; Allergy; AR (allergic rhinitis); Hypercalcemia (9/10/2015); Hypertension; IGT (impaired glucose tolerance) (9/9/2014); and Pneumonia (12/2016).    Surgical History:  has a past surgical history that includes Colposcopy; Hysterectomy; Cholecystectomy; Dilation and curettage of uterus; Oophorectomy; Colonoscopy (N/A, 9/24/2016); and Abdominal surgery.    Family History: family history includes Breast cancer (age of onset: 44) in her sister; Diabetes in her mother; Glaucoma in her father and mother; Hypertension in her mother and sister; No Known Problems in her brother, maternal aunt, maternal grandfather, maternal grandmother, maternal uncle, paternal aunt, paternal grandfather, paternal grandmother, and paternal uncle.. Otherwise no colon cancer, inflammatory bowel disease, or GI malignancies.    Social History:  reports that she has never smoked. She has never used smokeless tobacco. She reports that she drinks alcohol. She reports that she does not use drugs.    Review of patient's allergies indicates:   Allergen Reactions    Sulfa (sulfonamide antibiotics) Hives and Edema       Medications:   Prescriptions Prior to Admission   Medication Sig Dispense Refill Last Dose    amLODIPine (NORVASC) 10 MG tablet TAKE ONE TABLET BY MOUTH ONCE DAILY 30 tablet 11 12/27/2017 at Unknown time    cetirizine    SUBJECTIVE:                                                   Kimberly Rankin is a 54 year old female who presents to clinic today for the following health issue(s):  Patient presents with:  Vaginal Problem: Vaginal Pain     Additional information: Patient is here for vaginal pain, which she has been experiencing for almost a month.    HPI:  Kimberly is a 53 yo perimenopausal female with PMHx menorrhagia, uterine fibroids, genital HSV who presents with vulvar pain and itching x 1 month.     She reports that  1 month ago she began noticing irritation of vulvar surface and intense itching, especially in the evenings. When looking at the area in the past week, she noticed whitening of the skin along the affected area and a new sore spot to the left side of the clitoris which has been increasingly painful.     Had previously been using suppressive Valtrex regimen for genital HSV but discontinued a few months ago when she ran out of her medication. No recent blisters of genital region. Has not had any other changes to medications.     She has not had any changes in stressors, diet, exercise, or other lifestyle factors. Denies changes in products, soaps, detergents, toilet papers, etc and has not used any products to the affected vulvar surface. Denies internal vaginal pain / irritation, changes in vaginal discharge, changes in vaginal bleeding. Unsure if change in vaginal odor. She denies pain with intercourse.     Patient's last menstrual period was 10/30/2024..     Patient is sexually active, .  Using vasectomy for contraception.    reports that she has never smoked. She has never used smokeless tobacco.  STD testing offered?  Declined    Health maintenance updated:  yes    Today's PHQ-2 Score:       2024     8:48 PM   PHQ-2 (  [employer])   Q1: Little interest or pleasure in doing things 0    Q2: Feeling down, depressed or hopeless 0    PHQ-2 Score 0   Q1: Little interest or pleasure in doing things  -- DO NOT REPLY / DO NOT REPLY ALL --  -- Message is from the [facility]--    COVID-19 Universal Screening: Negative    Caller is requesting an appointment - at a sooner time than what was available.      Caller declined scheduling with a trusted partner or sister site               Patient is willing to be seen by: PCP only    Reason for Visit: Constipation and physical. If patient can be sooner it would be greatly appreciate    Is the patient currently scheduled? Yes.  Appointment date:  12/29/2020 at 1 pm    Preferred time to be seen: Asap    Caller Information       Type Contact Phone    12/02/2020 12:35 PM CST Phone (Incoming) marge harvey (Emergency Contact) [phone]          Alternative phone number: [phone] (Madelaine Han)    Turnaround time given to caller:   \"This message will be sent to [state Provider's name]. The clinical team will fulfill your request as soon as they review your message.\"   (ZYRTEC) 10 MG tablet Take 10 mg by mouth once daily.   Past Week at Unknown time    hydrocortisone butyrate 0.1 % Crea cream APPLY  CREAM TO AFFECTED AREA OF FACE TWICE DAILY (Patient taking differently: APPLY  CREAM TO AFFECTED AREA OF FACE TWICE DAILY as needed) 45 g 3 Past Week at Unknown time    triamcinolone acetonide 0.1% (KENALOG) 0.1 % cream AAA bid (Patient taking differently: Apply topically. Apply to chest and back as needed) 454 g 3 Past Week at Unknown time    balsalazide (COLAZAL) 750 mg capsule Take 3 capsules (2,250 mg total) by mouth 3 (three) times daily. 270 capsule 3 Taking    epinephrine (EPIPEN) 0.3 mg/0.3 mL AtIn Inject 0.3 mLs (0.3 mg total) into the muscle as needed (extreme swelling, difficulty swallowing, voice changes or trouble breathing). 2 Device 0 Taking         Physical Exam:    Vital Signs:   Vitals:    12/27/17 1217   BP: (!) 147/86   Pulse: 77   Resp: 17   Temp: 98.1 °F (36.7 °C)       General Appearance: Well appearing in no acute distress  Eyes:    No scleral icterus  ENT: Neck supple, Lips, mucosa, and tongue normal; teeth and gums normal  Lungs: CTA bilaterally  Heart:  S1, S2 normal, no murmurs heard  Abdomen: Soft, non tender, non distended with positive bowel sounds. No hepatosplenomegaly, ascites, or mass.  Extremities: 2+ pulses, no clubbing, cyanosis or edema  Skin: No rash      Labs:  Lab Results   Component Value Date    WBC 8.03 01/26/2017    HGB 10.4 (L) 01/26/2017    HCT 32.6 (L) 01/26/2017     (H) 01/26/2017    CHOL 210 (H) 02/23/2013    TRIG 94 02/23/2013    HDL 40 02/23/2013    ALT 50 (H) 12/24/2016    AST 33 12/24/2016     12/24/2016    K 4.4 12/24/2016     12/24/2016    CREATININE 0.8 12/24/2016    BUN 8 12/24/2016    CO2 23 12/24/2016    TSH 0.067 (L) 12/02/2016    INR 1.0 12/20/2016    HGBA1C 5.6 12/02/2016       I have explained the risks and benefits of endoscopy procedures to the patient including but not limited to bleeding,  Not at all   Q2: Feeling down, depressed or hopeless Not at all   PHQ-2 Score 0       Patient-reported     Today's PHQ-9 Score:       12/3/2024     2:20 PM   PHQ-9 SCORE   PHQ-9 Total Score MyChart 0   PHQ-9 Total Score 0        Patient-reported     Today's JOSE-7 Score:       12/3/2024     2:20 PM   JOSE-7 SCORE   Total Score 0 (minimal anxiety)   Total Score 0        Patient-reported       Problem list and histories reviewed & adjusted, as indicated.  Additional history: as documented.    Patient Active Problem List   Diagnosis    Herpes simplex virus (HSV) infection - genital    Intramural leiomyoma of uterus    Allergic rhinitis    Tear of meniscus of right knee as current injury, unspecified meniscus, unspecified tear type, initial encounter    History of adverse effect of anesthesia    HSV-2 infection    Screening for cervical cancer    Complex cyst of right ovary    Hypercholesteremia    Menorrhagia with regular cycle    Syrinx of spinal cord (H)     Past Surgical History:   Procedure Laterality Date    ARTHROSCOPY KNEE WITH MENISCAL REPAIR Left 07/2019    BACK SURGERY  7/2020    COLONOSCOPY  12/24/2018    Sessile adenomatous polyp removed - Colonscopy Q 5 years    COLONOSCOPY N/A 9/26/2024    Procedure: Colonoscopy;  Surgeon: Tim Araujo MD;  Location:  GI    HYSTEROSCOPY DIAGNOSTIC  11/09/2016    Office hysteroscopy at [facility] [place]    NECK SURGERY  07/2020    hemilamectomy      Social History     Tobacco Use    Smoking status: Never    Smokeless tobacco: Never    Tobacco comments:     no use ever   Substance Use Topics    Alcohol use: Not Currently     Comment: Occasionally      Problem (# of Occurrences) Relation (Name,Age of Onset)    Dementia (2) Mother, Father (Ankur)    Kidney Disease (1) Daughter    Heart Disease (1) Father (Ankur)    Hypertension (1) Father (Ankur)    Hyperlipidemia (1) Father (Ankur)           Negative family history of: Breast Cancer, Colon Cancer, Ovarian Cancer  "             Current Outpatient Medications   Medication Sig Dispense Refill    clobetasol (TEMOVATE) 0.05 % external ointment Apply topically 2 times daily. 45 g 2    fish oil-omega-3 fatty acids 1000 MG capsule Take 2 g by mouth daily      ketoconazole (NIZORAL) 2 % external cream Apply to affected and surrounding area(s) once daily until clinical resolution, typically 1 to 3 weeks 30 g 1    multivitamin w/minerals (THERA-VIT-M) tablet Take 1 tablet by mouth daily      Probiotic Product (PROBIOTIC-10 PO)       vitamin D3 (CHOLECALCIFEROL) 50 mcg (2000 units) tablet Take 1 tablet (50 mcg) by mouth daily       No current facility-administered medications for this visit.     No Known Allergies    ROS:  12 point review of systems negative other than symptoms noted below or in the HPI.  No urinary frequency or dysuria, bladder or kidney problems      OBJECTIVE:     /70   Ht 1.676 m (5' 6\")   Wt 83.9 kg (185 lb)   LMP 10/30/2024   BMI 29.86 kg/m    Body mass index is 29.86 kg/m .    Exam:  Constitutional:  Appearance: Well nourished, well developed alert, in no acute distress  Neurologic:  Mental Status:  Oriented X3.  Normal strength and tone, sensory exam grossly normal, mentation intact and speech normal.    Psychiatric:  Mentation appears normal and affect normal/bright.  Pelvic Exam:  External Genitalia:     Lichenified appearance of vulvar surface with whitening along interlabial lines, bilateral labial fusion, and fusion of clitoral mcfadden. Left side of clitoral mcfadden appears mildly edematous with small ulcerated area which appears to have detached from previously fused area. Area moderately tender to palpation.   Vagina:     Normal vaginal vault without central or paravaginal defects, no discharge present, no inflammatory lesions present, no masses present  Bladder:     Nontender to palpation  Urethra:   Urethral Body:  Urethra palpation normal, urethra structural support normal   Urethral Meatus:  No " perforation, infection, and death.      Johnny Clayton MD     erythema or lesions present  Cervix:     Appearance healthy, no lesions present, nontender to palpation, no bleeding present  Perineum:     Perineum within normal limits, no evidence of trauma, no rashes or skin lesions present  Anus:     Anus within normal limits, no hemorrhoids present  Inguinal Lymph Nodes:     No lymphadenopathy present  Pubic Hair:     Normal pubic hair distribution for age  Genitalia and Groin:     No rashes present, no lesions present, no areas of discoloration, no masses present         In-Clinic Test Results:  No results found for this or any previous visit (from the past 24 hours).    ASSESSMENT/PLAN:                                                        ICD-10-CM    1. Vulvar pain  R10.2 Swab Aerobic Bacterial Culture Routine With Gram Stain     Multiplex Vaginal Panel by PCR     clobetasol (TEMOVATE) 0.05 % external ointment     Multiplex Vaginal Panel by PCR      2. Vulvar itching  L29.2 Swab Aerobic Bacterial Culture Routine With Gram Stain     Multiplex Vaginal Panel by PCR     clobetasol (TEMOVATE) 0.05 % external ointment     Multiplex Vaginal Panel by PCR      3. Lichen sclerosus et atrophicus of the vulva  N90.4 clobetasol (TEMOVATE) 0.05 % external ointment          Patient Instructions   Thank you for visiting the [facility].    Today we discussed vulvovaginal pain and itching. We discussed a condition called lichen sclerosus. This condition causes overactive turnover of skin cells which results in pain, itching, irritation, and discoloration.    A prescription for a steroid ointment (Clobetasol) was sent to your pharmacy. You should apply this ointment two times per day (morning, evening) x 2 weeks to the areas discussed during your visit. After two weeks you should drop down to 1 time per day.     In between use of this medication, you should apply a Vaseline / Bacitracin ointment to the clitoral mcfadden for barrier protection from friction and other  irritants to allow for healing.     You should follow up in 3-5 weeks to re-check your tissue and adjust our treatment plan as needed for your symptoms.     Please do not hesitate to contact the office if you have any questions or concerns.     Kimberly is a 53 yo perimenopausal female with PMHx menorrhagia, uterine fibroids, genital HSV who presents with vulvar pain and itching x 1 month.     Vulvar Itching / Lichen Sclerosus  - history and exam consistent with lichen sclerosus  - discussed symptoms and changes with patient, handout given  - Rx for Clobetasol BID x 2 weeks, then weekly x 8 weeks  - Vaseline / Bacitracin to ulcerated area of clitoral mcfadden  - bacterial swab of ulcerated area and MVP collected to evaluate for concurrent infections, results and treatment via MyChart as needed  - Patient will return to clinic in 3-5 weeks for tissue check to assess progress and adjust treatment regiment as needed  - will consider vulvar biopsy if limited treatment response or lack of healing at affected area left clitoral mcfadden    Smita Mello PA-C  [facility] [place]    Answers submitted by the patient for this visit:  Patient Health Questionnaire (Submitted on 12/3/2024)  If you checked off any problems, how difficult have these problems made it for you to do your work, take care of things at home, or get along with other people?: Not difficult at all  PHQ9 TOTAL SCORE: 0  Patient Health Questionnaire (G7) (Submitted on 12/3/2024)  JOSE 7 TOTAL SCORE: 0

## 2024-12-02 NOTE — PATIENT INSTRUCTIONS
Thank you for visiting the [facility].    Today we discussed vulvovaginal pain and itching. We discussed a condition called lichen sclerosus. This condition causes overactive turnover of skin cells which results in pain, itching, irritation, and discoloration.    A prescription for a steroid ointment (Clobetasol) was sent to your pharmacy. You should apply this ointment two times per day (morning, evening) x 2 weeks to the areas discussed during your visit. After two weeks you should drop down to 1 time per day.     In between use of this medication, you should apply a Vaseline / Bacitracin ointment to the clitoral mcfadden for barrier protection from friction and other irritants to allow for healing.     You should follow up in 3-5 weeks to re-check your tissue and adjust our treatment plan as needed for your symptoms.     Please do not hesitate to contact the office if you have any questions or concerns.

## 2024-12-03 ENCOUNTER — OFFICE VISIT (OUTPATIENT)
Dept: OBGYN | Facility: CLINIC | Age: 54
End: 2024-12-03
Payer: COMMERCIAL

## 2024-12-03 VITALS
WEIGHT: 185 LBS | DIASTOLIC BLOOD PRESSURE: 70 MMHG | HEIGHT: 66 IN | BODY MASS INDEX: 29.73 KG/M2 | SYSTOLIC BLOOD PRESSURE: 116 MMHG

## 2024-12-03 DIAGNOSIS — N90.4 LICHEN SCLEROSUS ET ATROPHICUS OF THE VULVA: ICD-10-CM

## 2024-12-03 DIAGNOSIS — R10.2 VULVAR PAIN: Primary | ICD-10-CM

## 2024-12-03 DIAGNOSIS — L29.2 VULVAR ITCHING: ICD-10-CM

## 2024-12-03 PROCEDURE — 87070 CULTURE OTHR SPECIMN AEROBIC: CPT

## 2024-12-03 PROCEDURE — 87205 SMEAR GRAM STAIN: CPT

## 2024-12-03 PROCEDURE — 99214 OFFICE O/P EST MOD 30 MIN: CPT

## 2024-12-03 PROCEDURE — G2211 COMPLEX E/M VISIT ADD ON: HCPCS

## 2024-12-03 PROCEDURE — 99459 PELVIC EXAMINATION: CPT

## 2024-12-03 PROCEDURE — 0352U MULTIPLEX VAGINAL PANEL BY PCR: CPT

## 2024-12-03 RX ORDER — CLOBETASOL PROPIONATE 0.5 MG/G
OINTMENT TOPICAL 2 TIMES DAILY
Qty: 45 G | Refills: 2 | Status: SHIPPED | OUTPATIENT
Start: 2024-12-03

## 2024-12-03 ASSESSMENT — ANXIETY QUESTIONNAIRES
2. NOT BEING ABLE TO STOP OR CONTROL WORRYING: NOT AT ALL
5. BEING SO RESTLESS THAT IT IS HARD TO SIT STILL: NOT AT ALL
GAD7 TOTAL SCORE: 0
4. TROUBLE RELAXING: NOT AT ALL
6. BECOMING EASILY ANNOYED OR IRRITABLE: NOT AT ALL
8. IF YOU CHECKED OFF ANY PROBLEMS, HOW DIFFICULT HAVE THESE MADE IT FOR YOU TO DO YOUR WORK, TAKE CARE OF THINGS AT HOME, OR GET ALONG WITH OTHER PEOPLE?: NOT DIFFICULT AT ALL
1. FEELING NERVOUS, ANXIOUS, OR ON EDGE: NOT AT ALL
7. FEELING AFRAID AS IF SOMETHING AWFUL MIGHT HAPPEN: NOT AT ALL
7. FEELING AFRAID AS IF SOMETHING AWFUL MIGHT HAPPEN: NOT AT ALL
IF YOU CHECKED OFF ANY PROBLEMS ON THIS QUESTIONNAIRE, HOW DIFFICULT HAVE THESE PROBLEMS MADE IT FOR YOU TO DO YOUR WORK, TAKE CARE OF THINGS AT HOME, OR GET ALONG WITH OTHER PEOPLE: NOT DIFFICULT AT ALL
3. WORRYING TOO MUCH ABOUT DIFFERENT THINGS: NOT AT ALL
GAD7 TOTAL SCORE: 0
GAD7 TOTAL SCORE: 0

## 2024-12-03 ASSESSMENT — PATIENT HEALTH QUESTIONNAIRE - PHQ9
SUM OF ALL RESPONSES TO PHQ QUESTIONS 1-9: 0
SUM OF ALL RESPONSES TO PHQ QUESTIONS 1-9: 0
10. IF YOU CHECKED OFF ANY PROBLEMS, HOW DIFFICULT HAVE THESE PROBLEMS MADE IT FOR YOU TO DO YOUR WORK, TAKE CARE OF THINGS AT HOME, OR GET ALONG WITH OTHER PEOPLE: NOT DIFFICULT AT ALL

## 2024-12-04 LAB
BACTERIAL VAGINOSIS VAG-IMP: NEGATIVE
CANDIDA DNA VAG QL NAA+PROBE: NOT DETECTED
CANDIDA GLABRATA / CANDIDA KRUSEI DNA: NOT DETECTED
T VAGINALIS DNA VAG QL NAA+PROBE: NOT DETECTED

## 2024-12-05 LAB
BACTERIA WND CULT: ABNORMAL
GRAM STAIN RESULT: ABNORMAL
GRAM STAIN RESULT: ABNORMAL

## 2025-08-25 SDOH — HEALTH STABILITY: PHYSICAL HEALTH: ON AVERAGE, HOW MANY DAYS PER WEEK DO YOU ENGAGE IN MODERATE TO STRENUOUS EXERCISE (LIKE A BRISK WALK)?: 6 DAYS

## 2025-08-25 SDOH — HEALTH STABILITY: PHYSICAL HEALTH: ON AVERAGE, HOW MANY MINUTES DO YOU ENGAGE IN EXERCISE AT THIS LEVEL?: 50 MIN

## 2025-08-26 ENCOUNTER — OFFICE VISIT (OUTPATIENT)
Dept: FAMILY MEDICINE | Facility: CLINIC | Age: 55
End: 2025-08-26
Payer: COMMERCIAL

## 2025-08-26 VITALS
WEIGHT: 154.5 LBS | SYSTOLIC BLOOD PRESSURE: 110 MMHG | HEIGHT: 66 IN | RESPIRATION RATE: 14 BRPM | HEART RATE: 68 BPM | OXYGEN SATURATION: 99 % | DIASTOLIC BLOOD PRESSURE: 60 MMHG | TEMPERATURE: 97.4 F | BODY MASS INDEX: 24.83 KG/M2

## 2025-08-26 DIAGNOSIS — Z13.0 SCREENING FOR DEFICIENCY ANEMIA: ICD-10-CM

## 2025-08-26 DIAGNOSIS — Z13.6 SCREENING FOR CARDIOVASCULAR CONDITION: ICD-10-CM

## 2025-08-26 DIAGNOSIS — E78.00 HYPERCHOLESTEREMIA: ICD-10-CM

## 2025-08-26 DIAGNOSIS — Z13.1 SCREENING FOR DIABETES MELLITUS: ICD-10-CM

## 2025-08-26 DIAGNOSIS — N92.0 MENORRHAGIA WITH REGULAR CYCLE: ICD-10-CM

## 2025-08-26 DIAGNOSIS — Z13.29 SCREENING FOR THYROID DISORDER: ICD-10-CM

## 2025-08-26 DIAGNOSIS — Z00.00 ROUTINE GENERAL MEDICAL EXAMINATION AT A HEALTH CARE FACILITY: Primary | ICD-10-CM

## 2025-08-26 LAB
ALBUMIN SERPL BCG-MCNC: 4.5 G/DL (ref 3.5–5.2)
ALP SERPL-CCNC: 31 U/L (ref 40–150)
ALT SERPL W P-5'-P-CCNC: 26 U/L (ref 0–50)
ANION GAP SERPL CALCULATED.3IONS-SCNC: 13 MMOL/L (ref 7–15)
AST SERPL W P-5'-P-CCNC: 26 U/L (ref 0–45)
BILIRUB SERPL-MCNC: 0.2 MG/DL
BUN SERPL-MCNC: 12.8 MG/DL (ref 6–20)
CALCIUM SERPL-MCNC: 9.5 MG/DL (ref 8.8–10.4)
CHLORIDE SERPL-SCNC: 101 MMOL/L (ref 98–107)
CHOLEST SERPL-MCNC: 281 MG/DL
CREAT SERPL-MCNC: 0.7 MG/DL (ref 0.51–0.95)
EGFRCR SERPLBLD CKD-EPI 2021: >90 ML/MIN/1.73M2
ERYTHROCYTE [DISTWIDTH] IN BLOOD BY AUTOMATED COUNT: 13.5 % (ref 10–15)
FASTING STATUS PATIENT QL REPORTED: YES
FASTING STATUS PATIENT QL REPORTED: YES
GLUCOSE SERPL-MCNC: 82 MG/DL (ref 70–99)
HCO3 SERPL-SCNC: 28 MMOL/L (ref 22–29)
HCT VFR BLD AUTO: 40.5 % (ref 35–47)
HDLC SERPL-MCNC: 75 MG/DL
HGB BLD-MCNC: 13.6 G/DL (ref 11.7–15.7)
LDLC SERPL CALC-MCNC: 193 MG/DL
MCH RBC QN AUTO: 31.6 PG (ref 26.5–33)
MCHC RBC AUTO-ENTMCNC: 33.6 G/DL (ref 31.5–36.5)
MCV RBC AUTO: 94 FL (ref 78–100)
NONHDLC SERPL-MCNC: 206 MG/DL
PLATELET # BLD AUTO: 199 10E3/UL (ref 150–450)
POTASSIUM SERPL-SCNC: 4.3 MMOL/L (ref 3.4–5.3)
PROT SERPL-MCNC: 7.1 G/DL (ref 6.4–8.3)
RBC # BLD AUTO: 4.31 10E6/UL (ref 3.8–5.2)
SODIUM SERPL-SCNC: 142 MMOL/L (ref 135–145)
TRIGL SERPL-MCNC: 63 MG/DL
TSH SERPL DL<=0.005 MIU/L-ACNC: 2.22 UIU/ML (ref 0.3–4.2)
WBC # BLD AUTO: 3.17 10E3/UL (ref 4–11)

## 2025-08-26 PROCEDURE — 36415 COLL VENOUS BLD VENIPUNCTURE: CPT | Performed by: NURSE PRACTITIONER

## 2025-08-26 PROCEDURE — 85027 COMPLETE CBC AUTOMATED: CPT | Performed by: NURSE PRACTITIONER

## 2025-08-26 PROCEDURE — 84443 ASSAY THYROID STIM HORMONE: CPT | Performed by: NURSE PRACTITIONER

## 2025-08-26 PROCEDURE — 80061 LIPID PANEL: CPT | Performed by: NURSE PRACTITIONER

## 2025-08-26 PROCEDURE — 80053 COMPREHEN METABOLIC PANEL: CPT | Performed by: NURSE PRACTITIONER

## (undated) DEVICE — KIT ENDO TURNOVER/PROCEDURE W/CLEAN A SCOPE LINERS 103888

## (undated) RX ORDER — FENTANYL CITRATE 50 UG/ML
INJECTION, SOLUTION INTRAMUSCULAR; INTRAVENOUS
Status: DISPENSED
Start: 2024-09-26